# Patient Record
Sex: FEMALE | Race: OTHER | NOT HISPANIC OR LATINO | Employment: FULL TIME | URBAN - METROPOLITAN AREA
[De-identification: names, ages, dates, MRNs, and addresses within clinical notes are randomized per-mention and may not be internally consistent; named-entity substitution may affect disease eponyms.]

---

## 2018-03-27 PROBLEM — Z30.431 IUD CHECK UP: Status: ACTIVE | Noted: 2018-03-27

## 2018-04-30 PROBLEM — Z30.433 ENCOUNTER FOR IUD REMOVAL AND REINSERTION: Status: ACTIVE | Noted: 2018-04-30

## 2018-04-30 PROBLEM — Z01.818 PREOP EXAMINATION: Status: ACTIVE | Noted: 2018-04-30

## 2021-05-06 ENCOUNTER — PATIENT MESSAGE (OUTPATIENT)
Dept: RESEARCH | Facility: HOSPITAL | Age: 36
End: 2021-05-06

## 2022-01-12 ENCOUNTER — PATIENT MESSAGE (OUTPATIENT)
Dept: ADMINISTRATIVE | Facility: OTHER | Age: 37
End: 2022-01-12

## 2022-02-23 PROBLEM — F41.1 GENERALIZED ANXIETY DISORDER: Status: ACTIVE | Noted: 2022-02-23

## 2022-02-23 PROBLEM — Z01.818 PREOP EXAMINATION: Status: RESOLVED | Noted: 2018-04-30 | Resolved: 2022-02-23

## 2022-02-23 PROBLEM — J45.40 MODERATE PERSISTENT ASTHMA WITHOUT COMPLICATION: Status: ACTIVE | Noted: 2022-02-23

## 2022-02-23 PROBLEM — Z30.433 ENCOUNTER FOR IUD REMOVAL AND REINSERTION: Status: RESOLVED | Noted: 2018-04-30 | Resolved: 2022-02-23

## 2022-02-23 PROBLEM — Z30.431 IUD CHECK UP: Status: RESOLVED | Noted: 2018-03-27 | Resolved: 2022-02-23

## 2022-03-31 ENCOUNTER — OFFICE VISIT (OUTPATIENT)
Dept: URGENT CARE | Facility: CLINIC | Age: 37
End: 2022-03-31
Payer: COMMERCIAL

## 2022-03-31 VITALS
WEIGHT: 140 LBS | RESPIRATION RATE: 17 BRPM | HEART RATE: 99 BPM | OXYGEN SATURATION: 99 % | BODY MASS INDEX: 23.32 KG/M2 | SYSTOLIC BLOOD PRESSURE: 132 MMHG | HEIGHT: 65 IN | DIASTOLIC BLOOD PRESSURE: 85 MMHG | TEMPERATURE: 97 F

## 2022-03-31 DIAGNOSIS — S90.512A INFECTED ABRASION OF LEFT ANKLE, INITIAL ENCOUNTER: ICD-10-CM

## 2022-03-31 DIAGNOSIS — S99.912A INJURY OF LEFT ANKLE, INITIAL ENCOUNTER: Primary | ICD-10-CM

## 2022-03-31 DIAGNOSIS — L03.116 CELLULITIS OF LEFT ANKLE: ICD-10-CM

## 2022-03-31 DIAGNOSIS — S82.832A CLOSED FRACTURE OF DISTAL END OF LEFT FIBULA, UNSPECIFIED FRACTURE MORPHOLOGY, INITIAL ENCOUNTER: ICD-10-CM

## 2022-03-31 DIAGNOSIS — L08.9 INFECTED ABRASION OF LEFT ANKLE, INITIAL ENCOUNTER: ICD-10-CM

## 2022-03-31 PROCEDURE — 3079F DIAST BP 80-89 MM HG: CPT | Mod: CPTII,S$GLB,, | Performed by: PHYSICIAN ASSISTANT

## 2022-03-31 PROCEDURE — 90715 TDAP VACCINE GREATER THAN OR EQUAL TO 7YO IM: ICD-10-PCS | Mod: S$GLB,,, | Performed by: PHYSICIAN ASSISTANT

## 2022-03-31 PROCEDURE — 1160F PR REVIEW ALL MEDS BY PRESCRIBER/CLIN PHARMACIST DOCUMENTED: ICD-10-PCS | Mod: CPTII,S$GLB,, | Performed by: PHYSICIAN ASSISTANT

## 2022-03-31 PROCEDURE — 90715 TDAP VACCINE 7 YRS/> IM: CPT | Mod: S$GLB,,, | Performed by: PHYSICIAN ASSISTANT

## 2022-03-31 PROCEDURE — 99203 PR OFFICE/OUTPT VISIT, NEW, LEVL III, 30-44 MIN: ICD-10-PCS | Mod: 25,S$GLB,, | Performed by: PHYSICIAN ASSISTANT

## 2022-03-31 PROCEDURE — 1159F MED LIST DOCD IN RCRD: CPT | Mod: CPTII,S$GLB,, | Performed by: PHYSICIAN ASSISTANT

## 2022-03-31 PROCEDURE — 3079F PR MOST RECENT DIASTOLIC BLOOD PRESSURE 80-89 MM HG: ICD-10-PCS | Mod: CPTII,S$GLB,, | Performed by: PHYSICIAN ASSISTANT

## 2022-03-31 PROCEDURE — 73610 X-RAY EXAM OF ANKLE: CPT | Mod: LT,S$GLB,, | Performed by: RADIOLOGY

## 2022-03-31 PROCEDURE — 1159F PR MEDICATION LIST DOCUMENTED IN MEDICAL RECORD: ICD-10-PCS | Mod: CPTII,S$GLB,, | Performed by: PHYSICIAN ASSISTANT

## 2022-03-31 PROCEDURE — 3075F SYST BP GE 130 - 139MM HG: CPT | Mod: CPTII,S$GLB,, | Performed by: PHYSICIAN ASSISTANT

## 2022-03-31 PROCEDURE — 99203 OFFICE O/P NEW LOW 30 MIN: CPT | Mod: 25,S$GLB,, | Performed by: PHYSICIAN ASSISTANT

## 2022-03-31 PROCEDURE — 3008F PR BODY MASS INDEX (BMI) DOCUMENTED: ICD-10-PCS | Mod: CPTII,S$GLB,, | Performed by: PHYSICIAN ASSISTANT

## 2022-03-31 PROCEDURE — 73610 XR ANKLE COMPLETE 3 VIEW LEFT: ICD-10-PCS | Mod: LT,S$GLB,, | Performed by: RADIOLOGY

## 2022-03-31 PROCEDURE — 90471 TDAP VACCINE GREATER THAN OR EQUAL TO 7YO IM: ICD-10-PCS | Mod: S$GLB,,, | Performed by: PHYSICIAN ASSISTANT

## 2022-03-31 PROCEDURE — 3008F BODY MASS INDEX DOCD: CPT | Mod: CPTII,S$GLB,, | Performed by: PHYSICIAN ASSISTANT

## 2022-03-31 PROCEDURE — 3075F PR MOST RECENT SYSTOLIC BLOOD PRESS GE 130-139MM HG: ICD-10-PCS | Mod: CPTII,S$GLB,, | Performed by: PHYSICIAN ASSISTANT

## 2022-03-31 PROCEDURE — 90471 IMMUNIZATION ADMIN: CPT | Mod: S$GLB,,, | Performed by: PHYSICIAN ASSISTANT

## 2022-03-31 PROCEDURE — 1160F RVW MEDS BY RX/DR IN RCRD: CPT | Mod: CPTII,S$GLB,, | Performed by: PHYSICIAN ASSISTANT

## 2022-03-31 RX ORDER — MUPIROCIN 20 MG/G
OINTMENT TOPICAL 2 TIMES DAILY
Qty: 30 G | Refills: 0 | Status: SHIPPED | OUTPATIENT
Start: 2022-03-31 | End: 2022-04-10

## 2022-03-31 RX ORDER — TRAMADOL HYDROCHLORIDE 50 MG/1
50 TABLET ORAL EVERY 6 HOURS PRN
Qty: 12 TABLET | Refills: 0 | Status: SHIPPED | OUTPATIENT
Start: 2022-03-31 | End: 2022-04-10

## 2022-03-31 RX ORDER — CLINDAMYCIN HYDROCHLORIDE 150 MG/1
450 CAPSULE ORAL 3 TIMES DAILY
Qty: 90 CAPSULE | Refills: 0 | Status: SHIPPED | OUTPATIENT
Start: 2022-03-31 | End: 2022-04-10

## 2022-03-31 NOTE — PROGRESS NOTES
"Subjective:       Patient ID: Verónica Piña is a 37 y.o. female.    Vitals:  height is 5' 5" (1.651 m) and weight is 63.5 kg (140 lb). Her oral temperature is 97 °F (36.1 °C). Her blood pressure is 132/85 and her pulse is 99. Her respiration is 17 and oxygen saturation is 99%.     Chief Complaint: Leg Swelling (Left ankle, foot leg)    Patient stated over the weekend her dog took off running after a cat in her yard, but when her dog chased after the cat the rope the dog was attached to caught her ankle, which resulted in her falling.  Patient stated the water was hurting her foot when she took a bath.  Patient stated a rash is now on her left foot and her left foot looks swollen.  Unsure of her last tetanus shot.       Leg Pain   The incident occurred 3 to 5 days ago. The incident occurred at home. The injury mechanism was a fall. The pain is present in the left foot, left ankle, left leg and left toes. The quality of the pain is described as burning, aching, cramping, shooting and stabbing (throbbing). The pain is at a severity of 9/10. The pain is severe. The pain has been constant since onset. Associated symptoms include an inability to bear weight. It is unknown if a foreign body is present. The symptoms are aggravated by movement, weight bearing and palpation. Treatments tried: neosporin  The treatment provided mild relief.       Constitution: Negative for fever.   Musculoskeletal: Positive for pain, joint pain and joint swelling.   Skin: Positive for abrasion and erythema.       Objective:      Physical Exam   Constitutional: She is oriented to person, place, and time. She appears well-developed. She is cooperative.  Non-toxic appearance. She does not appear ill. No distress.   HENT:   Head: Normocephalic and atraumatic.   Ears:   Right Ear: Hearing normal.   Left Ear: Hearing normal.   Eyes: Conjunctivae and lids are normal. No scleral icterus.   Neck: Phonation normal. Neck supple.   Cardiovascular: " Normal rate and normal pulses.   Pulmonary/Chest: Effort normal. No respiratory distress.   Abdominal: Normal appearance.   Musculoskeletal:      Left ankle: She exhibits decreased range of motion and swelling. Tenderness.        Feet:       Comments: Intact distal pulses with no sensory deficits. Cap ref less than 3 secs. No bony deformity.    Neurological: She is alert and oriented to person, place, and time. Coordination normal.   Skin: Skin is intact, not diaphoretic and not pale. erythema   Psychiatric: Her speech is normal and behavior is normal. Judgment and thought content normal.   Nursing note and vitals reviewed.        Assessment:       1. Injury of left ankle, initial encounter    2. Infected abrasion of left ankle, initial encounter    3. Cellulitis of left ankle    4. Closed fracture of distal end of left fibula, unspecified fracture morphology, initial encounter          Plan:         Injury of left ankle, initial encounter  -     XR ANKLE COMPLETE 3 VIEW LEFT; Future; Expected date: 03/31/2022    Infected abrasion of left ankle, initial encounter  -     (In Office Administered) Tdap Vaccine  -     mupirocin (BACTROBAN) 2 % ointment; Apply topically 2 (two) times daily. for 10 days  Dispense: 30 g; Refill: 0    Cellulitis of left ankle  -     clindamycin (CLEOCIN) 150 MG capsule; Take 3 capsules (450 mg total) by mouth 3 (three) times daily. for 10 days  Dispense: 90 capsule; Refill: 0    Closed fracture of distal end of left fibula, unspecified fracture morphology, initial encounter  -     NON-PNEUMATIC WALKING BOOT FOR HOME USE  -     CRUTCHES FOR HOME USE  -     traMADoL (ULTRAM) 50 mg tablet; Take 1 tablet (50 mg total) by mouth every 6 (six) hours as needed for Pain.  Dispense: 12 tablet; Refill: 0    acute fracture of distal fibula. Nondisplaced.    Not open fracture. There is superficial infection due to abrasion with fracture from the injury. Elevate and close f/u with ortho. Take all  medications as prescribed. Urge nonweightbearing, elevation and rest at home. ER if infection does not improve or worsens in next 24-48 hours. Discussed with patient the importance of f/u with their primary care provider. Urged to go to the ER for any worsening signs or symptoms.

## 2022-03-31 NOTE — LETTER
March 31, 2022      [facility]  [address], [address]  [place] [address]  Phone: [phone]  Fax: [phone]       Patient: Verónica Piña   YOB: 1985  Date of Visit: 03/31/2022    To Whom It May Concern:    Michael Piña  was at [facility] on 03/31/2022. The patient may return to work/school with restrictions. Recommend nonweight bearing and sitting while working until cleared by Orthopedics. If you have any questions or concerns, or if I can be of further assistance, please do not hesitate to contact me.    Sincerely,    Keisha Mcgrath PA-C

## 2022-05-07 ENCOUNTER — OFFICE VISIT (OUTPATIENT)
Dept: URGENT CARE | Facility: CLINIC | Age: 37
End: 2022-05-07
Payer: COMMERCIAL

## 2022-05-07 VITALS
WEIGHT: 140 LBS | HEART RATE: 77 BPM | OXYGEN SATURATION: 99 % | TEMPERATURE: 98 F | RESPIRATION RATE: 16 BRPM | DIASTOLIC BLOOD PRESSURE: 69 MMHG | BODY MASS INDEX: 23.32 KG/M2 | SYSTOLIC BLOOD PRESSURE: 118 MMHG | HEIGHT: 65 IN

## 2022-05-07 DIAGNOSIS — S51.811A LACERATION OF RIGHT FOREARM, INITIAL ENCOUNTER: Primary | ICD-10-CM

## 2022-05-07 PROCEDURE — 99214 OFFICE O/P EST MOD 30 MIN: CPT | Mod: S$GLB,,, | Performed by: FAMILY MEDICINE

## 2022-05-07 PROCEDURE — 3078F PR MOST RECENT DIASTOLIC BLOOD PRESSURE < 80 MM HG: ICD-10-PCS | Mod: CPTII,S$GLB,, | Performed by: FAMILY MEDICINE

## 2022-05-07 PROCEDURE — 99214 PR OFFICE/OUTPT VISIT, EST, LEVL IV, 30-39 MIN: ICD-10-PCS | Mod: S$GLB,,, | Performed by: FAMILY MEDICINE

## 2022-05-07 PROCEDURE — 3074F SYST BP LT 130 MM HG: CPT | Mod: CPTII,S$GLB,, | Performed by: FAMILY MEDICINE

## 2022-05-07 PROCEDURE — 3008F BODY MASS INDEX DOCD: CPT | Mod: CPTII,S$GLB,, | Performed by: FAMILY MEDICINE

## 2022-05-07 PROCEDURE — 1160F PR REVIEW ALL MEDS BY PRESCRIBER/CLIN PHARMACIST DOCUMENTED: ICD-10-PCS | Mod: CPTII,S$GLB,, | Performed by: FAMILY MEDICINE

## 2022-05-07 PROCEDURE — 1159F PR MEDICATION LIST DOCUMENTED IN MEDICAL RECORD: ICD-10-PCS | Mod: CPTII,S$GLB,, | Performed by: FAMILY MEDICINE

## 2022-05-07 PROCEDURE — 1160F RVW MEDS BY RX/DR IN RCRD: CPT | Mod: CPTII,S$GLB,, | Performed by: FAMILY MEDICINE

## 2022-05-07 PROCEDURE — 1159F MED LIST DOCD IN RCRD: CPT | Mod: CPTII,S$GLB,, | Performed by: FAMILY MEDICINE

## 2022-05-07 PROCEDURE — 3078F DIAST BP <80 MM HG: CPT | Mod: CPTII,S$GLB,, | Performed by: FAMILY MEDICINE

## 2022-05-07 PROCEDURE — 3008F PR BODY MASS INDEX (BMI) DOCUMENTED: ICD-10-PCS | Mod: CPTII,S$GLB,, | Performed by: FAMILY MEDICINE

## 2022-05-07 PROCEDURE — 3074F PR MOST RECENT SYSTOLIC BLOOD PRESSURE < 130 MM HG: ICD-10-PCS | Mod: CPTII,S$GLB,, | Performed by: FAMILY MEDICINE

## 2022-05-07 RX ORDER — CEPHALEXIN 250 MG/1
250 CAPSULE ORAL 4 TIMES DAILY
Qty: 40 CAPSULE | Refills: 0 | Status: SHIPPED | OUTPATIENT
Start: 2022-05-07 | End: 2022-05-17

## 2022-05-07 RX ORDER — MUPIROCIN 20 MG/G
OINTMENT TOPICAL 2 TIMES DAILY
Qty: 30 G | Refills: 0 | Status: SHIPPED | OUTPATIENT
Start: 2022-05-07

## 2022-05-07 RX ORDER — NAPROXEN 375 MG/1
375 TABLET ORAL 2 TIMES DAILY
Qty: 20 TABLET | Refills: 0 | Status: SHIPPED | OUTPATIENT
Start: 2022-05-07

## 2022-05-07 NOTE — PATIENT INSTRUCTIONS
Please drink plenty of fluids.  Please get plenty of rest.  Please return here or go to the Emergency Department for any concerns or worsening of condition.  If you were prescribed antibiotics, please take them to completion.  If you were prescribed a narcotic medication, do not drive or operate heavy equipment or machinery while taking these medications.      If not allergic, please take over the counter Tylenol (Acetaminophen) and/or Motrin (Ibuprofen) as directed for control of pain and/or fever.    Your tetanus was brought up to date if needed.    Keep wound clean and dry.  You may use a plastic bag to keep area dry while showering    Clean wound once or twice a day with soap and water, then apply antibiotic ointment and redress wound.      Please follow up with your primary care doctor or specialist as needed.  Primary Doctor No  None    You must understand that you have received treatment at an Urgent Care facility only, and that you may be  released before all of your medical problems are known or treated. Urgent Care facilities are not equipped to  handle life threatening emergencies. It is recommended that you seek care at an Emergency Department for  further evaluation of worsening or concerning symptoms, or possibly life threatening conditions as  Discussed.    Laceration: All Closures  A laceration is a cut through the skin. This will usually require stitches (sutures) or staples if it is deep. Minor cuts may be treated with a surgical tape closure or skin glue.    Home care  Your healthcare provider may prescribe an antibiotic. This is to help prevent infection. Follow all instructions for taking this medicine. Take the medicine every day until it is gone or you are told to stop. You should not have any left over.  The healthcare provider may prescribe medicines for pain. Follow instructions for taking them.  Follow the healthcare providers instructions on how to care for the cut.  Keep the wound clean  and dry. Do not get the wound wet until you are told it is okay to do so. If the area gets wet, gently pat it dry with a clean cloth. Replace the wet bandage with a dry one.  If a bandage was applied and it becomes wet or dirty, replace it. Otherwise, leave it in place for the first 24 hours.  Caring for sutures or staples: Once you no longer need to keep them dry, clean the wound daily. First, remove the bandage. Then wash the area gently with soap and warm water, or as directed by the health care provider. Use a wet cotton swab to loosen and remove any blood or crust that forms. After cleaning, apply a thin layer of antibiotic ointment if advised. Then put on a new bandage unless you are told not to.  Caring for skin glue: Dont put apply liquid, ointment, or cream on the wound while the glue is in place. Avoid activities that cause heavy sweating. Protect the wound from sunlight. Do not scratch, rub, or pick at the adhesive film. Do not place tape directly over the film. The glue should peel off within 5 to 10 days.   Caring for surgical tape: Keep the area dry. If it gets wet, blot it dry with a clean towel. Surgical tape usually falls off within 7 to 10 days. If it has not fallen off after 10 days, you can take it off yourself. Put mineral oil or petroleum jelly on a cotton ball and gently rub the tape until it is removed.  Once you can get the wound wet, you may shower as usual but do not soak the wound in water (no tub baths or swimming)  Even with proper treatment, a wound infection may sometimes occur. Check the wound daily for signs of infection listed below.  Scalp wounds  During the first two days, you may carefully rinse your hair in the shower to remove blood, glass or dirt particles. After two days, you may shower and shampoo your hair normally. Do not soak your scalp in the tub or go swimming until the stitches or staples have been removed. Talk with your healthcare provider before applying any  antibiotic ointment to the wound.  Mouth wounds  Eat soft foods to reduce pain. If the cut is inside of your mouth, clean by rinsing after each meal and at bedtime with a mixture of equal parts water and hydrogen peroxide (do not swallow!). Or, you can use a cotton swab to directly apply hydrogen peroxide onto the cut. Mouth wounds can be painful when eating. You may use an over-the-counter local numbing solution for pain relief. If this is not available, you may use any numbing solution intended for teething babies. You may apply this directly to the sores with a cotton-tip swab or with your finger.  Follow-up care  Follow up with your healthcare provider as advised. Ask your healthcare provider how long sutures should be left in place. Be sure to return for suture removal as directed. If dissolving stitches were used in the mouth, these should fall out or dissolve without the need for removal. If tape closures were used, remove them yourself when your provider recommends if they have not fallen off on their own. If skin glue was used, the film will wear off by itself.  When to seek medical advice  Call your healthcare provider right away if any of these occur:  Wound bleeding not controlled by direct pressure  Signs of infection, including increasing pain in the wound, increasing wound redness or swelling, or pus or bad odor coming from the wound  Fever of 100.4°F (38.ºC) or higher or as directed by your healthcare provider  Stitches or staples come apart or fall out or surgical tape falls off before 7 days  Wound edges re-open  Wound changes colors  Numbness around the wound   Decreased movement around the injured area  Date Last Reviewed: 6/14/2015 © 2000-2016 [employer]. [address] [place] [address], [place] [address]. All rights reserved. This information is not intended as a substitute for professional medical care. Always follow your healthcare professional's instructions.

## 2022-05-07 NOTE — PROGRESS NOTES
"Subjective:       Patient ID: Verónica Piña is a 37 y.o. female.    Vitals:  height is 5' 5" (1.651 m) and weight is 63.5 kg (140 lb). Her tympanic temperature is 98.4 °F (36.9 °C). Her blood pressure is 118/69 and her pulse is 77. Her respiration is 16 and oxygen saturation is 99%.     Chief Complaint: Laceration (Pt. presents today w/ laceration to right forearm w/ swelling. State's slipped and fell onto unknown foreign body.  X1 day.   )    Laceration   The incident occurred 12 to 24 hours ago. The laceration is located on the right arm. The laceration mechanism is unknown.The pain is at a severity of 3/10. The pain is mild. The pain has been constant since onset. Her tetanus status is UTD.       Constitution: Negative.   HENT: Negative.    Cardiovascular: Negative.    Eyes: Negative.    Respiratory: Negative.    Gastrointestinal: Negative.    Endocrine: negative.   Genitourinary: Negative.    Musculoskeletal: Positive for pain, trauma, joint pain and joint swelling.   Skin: Negative.  Positive for laceration.   Allergic/Immunologic: Negative.  Positive for immunizations up-to-date.   Neurological: Negative.    Hematologic/Lymphatic: Negative.    Psychiatric/Behavioral: Negative.        Objective:      Physical Exam   Constitutional: She is oriented to person, place, and time. She appears well-developed. She is cooperative.  Non-toxic appearance. She does not appear ill. No distress.   HENT:   Head: Normocephalic and atraumatic. Head is without abrasion, without contusion and without laceration.   Ears:   Right Ear: Hearing, tympanic membrane, external ear and ear canal normal. No hemotympanum.   Left Ear: Hearing, tympanic membrane, external ear and ear canal normal. No hemotympanum.   Nose: Nose normal. No mucosal edema, rhinorrhea or nasal deformity. No epistaxis. Right sinus exhibits no maxillary sinus tenderness and no frontal sinus tenderness. Left sinus exhibits no maxillary sinus tenderness and no " frontal sinus tenderness.   Mouth/Throat: Uvula is midline, oropharynx is clear and moist and mucous membranes are normal. No trismus in the jaw. Normal dentition. No uvula swelling. No posterior oropharyngeal erythema.   Eyes: Conjunctivae, EOM and lids are normal. Pupils are equal, round, and reactive to light. Right eye exhibits no discharge. Left eye exhibits no discharge. No scleral icterus.   Neck: Trachea normal and phonation normal. Neck supple. No tracheal deviation present. No neck rigidity present. No spinous process tenderness present. No muscular tenderness present.   Cardiovascular: Normal rate, regular rhythm, normal heart sounds and normal pulses.   Pulmonary/Chest: Effort normal and breath sounds normal. No respiratory distress.   Abdominal: Normal appearance and bowel sounds are normal. She exhibits no distension, no pulsatile midline mass and no mass. Soft. There is no abdominal tenderness.   Musculoskeletal: Normal range of motion.         General: No deformity. Normal range of motion.      Right forearm: She exhibits laceration.        Arms:    Neurological: She is alert and oriented to person, place, and time. She has normal strength. No cranial nerve deficit or sensory deficit. She exhibits normal muscle tone. She displays no seizure activity. Coordination normal. GCS eye subscore is 4. GCS verbal subscore is 5. GCS motor subscore is 6.   Skin: Skin is warm, dry, intact, not diaphoretic and not pale. Capillary refill takes less than 2 seconds. Lacerations - upper ext.:  right forearmNo abrasion, No burn, No bruising and No ecchymosis   Psychiatric: Her speech is normal and behavior is normal. Judgment and thought content normal.   Nursing note and vitals reviewed.        Assessment:       1. Laceration of right forearm, initial encounter          Plan:     Xray not available today, patient was informed that xray not available today before signing in.  Td up to date.    Pressure dressing  applied to wound in office today.    Laceration of right forearm, initial encounter  -     cephALEXin (KEFLEX) 250 MG capsule; Take 1 capsule (250 mg total) by mouth 4 (four) times daily. for 10 days  Dispense: 40 capsule; Refill: 0  -     mupirocin (BACTROBAN) 2 % ointment; Apply topically 2 (two) times daily.  Dispense: 30 g; Refill: 0  -     naproxen (NAPROSYN) 375 MG tablet; Take 1 tablet (375 mg total) by mouth 2 (two) times daily.  Dispense: 20 tablet; Refill: 0     Please drink plenty of fluids.  Please get plenty of rest.  Please return here or go to the Emergency Department for any concerns or worsening of condition.  If you were prescribed antibiotics, please take them to completion.  If you were prescribed a narcotic medication, do not drive or operate heavy equipment or machinery while taking these medications.      If not allergic, please take over the counter Tylenol (Acetaminophen) and/or Motrin (Ibuprofen) as directed for control of pain and/or fever.    Your tetanus was brought up to date if needed.    Keep wound clean and dry.  You may use a plastic bag to keep area dry while showering    Clean wound once or twice a day with soap and water, then apply antibiotic ointment and redress wound.      Please follow up with your primary care doctor or specialist as needed.  Primary Doctor No  None    You must understand that you have received treatment at an Urgent Care facility only, and that you may be  released before all of your medical problems are known or treated. Urgent Care facilities are not equipped to  handle life threatening emergencies. It is recommended that you seek care at an Emergency Department for  further evaluation of worsening or concerning symptoms, or possibly life threatening conditions as  discussed.

## 2022-05-07 NOTE — LETTER
May 7, 2022  Verónica Piña  [address] [place] [address]                [facility]  [address], [address]  [place] [address]  Phone: [phone]  Fax: [phone] Verónica Piña was seen and treated in our Urgent Care department on 5/7/2022. She may return to work in 2 - 3 days.      If you have any questions or concerns, please don't hesitate to call.        Sincerely,        Tommy Varma MD

## 2022-05-13 ENCOUNTER — OFFICE VISIT (OUTPATIENT)
Dept: URGENT CARE | Facility: CLINIC | Age: 37
End: 2022-05-13
Payer: COMMERCIAL

## 2022-05-13 VITALS
TEMPERATURE: 99 F | BODY MASS INDEX: 23.32 KG/M2 | OXYGEN SATURATION: 98 % | RESPIRATION RATE: 18 BRPM | SYSTOLIC BLOOD PRESSURE: 134 MMHG | WEIGHT: 140 LBS | HEART RATE: 88 BPM | DIASTOLIC BLOOD PRESSURE: 88 MMHG | HEIGHT: 65 IN

## 2022-05-13 DIAGNOSIS — S40.021A CONTUSION OF RIGHT UPPER EXTREMITY, INITIAL ENCOUNTER: Primary | ICD-10-CM

## 2022-05-13 PROCEDURE — 1159F MED LIST DOCD IN RCRD: CPT | Mod: CPTII,S$GLB,, | Performed by: FAMILY MEDICINE

## 2022-05-13 PROCEDURE — 1159F PR MEDICATION LIST DOCUMENTED IN MEDICAL RECORD: ICD-10-PCS | Mod: CPTII,S$GLB,, | Performed by: FAMILY MEDICINE

## 2022-05-13 PROCEDURE — 99214 PR OFFICE/OUTPT VISIT, EST, LEVL IV, 30-39 MIN: ICD-10-PCS | Mod: S$GLB,,, | Performed by: FAMILY MEDICINE

## 2022-05-13 PROCEDURE — 3075F PR MOST RECENT SYSTOLIC BLOOD PRESS GE 130-139MM HG: ICD-10-PCS | Mod: CPTII,S$GLB,, | Performed by: FAMILY MEDICINE

## 2022-05-13 PROCEDURE — 3079F DIAST BP 80-89 MM HG: CPT | Mod: CPTII,S$GLB,, | Performed by: FAMILY MEDICINE

## 2022-05-13 PROCEDURE — 73090 XR FOREARM RIGHT: ICD-10-PCS | Mod: RT,S$GLB,, | Performed by: RADIOLOGY

## 2022-05-13 PROCEDURE — 1160F RVW MEDS BY RX/DR IN RCRD: CPT | Mod: CPTII,S$GLB,, | Performed by: FAMILY MEDICINE

## 2022-05-13 PROCEDURE — 3075F SYST BP GE 130 - 139MM HG: CPT | Mod: CPTII,S$GLB,, | Performed by: FAMILY MEDICINE

## 2022-05-13 PROCEDURE — 73090 X-RAY EXAM OF FOREARM: CPT | Mod: RT,S$GLB,, | Performed by: RADIOLOGY

## 2022-05-13 PROCEDURE — 99214 OFFICE O/P EST MOD 30 MIN: CPT | Mod: S$GLB,,, | Performed by: FAMILY MEDICINE

## 2022-05-13 PROCEDURE — 3008F PR BODY MASS INDEX (BMI) DOCUMENTED: ICD-10-PCS | Mod: CPTII,S$GLB,, | Performed by: FAMILY MEDICINE

## 2022-05-13 PROCEDURE — 3008F BODY MASS INDEX DOCD: CPT | Mod: CPTII,S$GLB,, | Performed by: FAMILY MEDICINE

## 2022-05-13 PROCEDURE — 3079F PR MOST RECENT DIASTOLIC BLOOD PRESSURE 80-89 MM HG: ICD-10-PCS | Mod: CPTII,S$GLB,, | Performed by: FAMILY MEDICINE

## 2022-05-13 PROCEDURE — 1160F PR REVIEW ALL MEDS BY PRESCRIBER/CLIN PHARMACIST DOCUMENTED: ICD-10-PCS | Mod: CPTII,S$GLB,, | Performed by: FAMILY MEDICINE

## 2022-05-13 RX ORDER — NAPROXEN 375 MG/1
375 TABLET ORAL 2 TIMES DAILY
Qty: 20 TABLET | Refills: 0 | Status: SHIPPED | OUTPATIENT
Start: 2022-05-13

## 2022-05-13 NOTE — LETTER
May 13, 2022  Verónica Piña  [address] [place] [address]                [facility]  [address], [address]  [place] [address]  Phone: [phone]  Fax: [phone] Verónica Piña was seen and treated in our Urgent Care department on 5/13/2022. She may return to work in 2 - 3 days.      If you have any questions or concerns, please don't hesitate to call.        Sincerely,        Tommy Varma MD

## 2022-05-13 NOTE — PROGRESS NOTES
"Subjective:       Patient ID: Verónica Piña is a 37 y.o. female.    Vitals:  height is 5' 5" (1.651 m) and weight is 63.5 kg (140 lb). Her tympanic temperature is 98.8 °F (37.1 °C). Her blood pressure is 134/88 and her pulse is 88. Her respiration is 18 and oxygen saturation is 98%.     Chief Complaint: Wrist Pain    Patient came in on the 7th and her ankles or both swollen and now her right wrist hurts when she moves it a certain way.  Feeling like a weird sensation to it.     Wrist Pain   The pain is present in the right wrist. This is a new problem. The current episode started in the past 7 days. The problem occurs constantly. The problem has been gradually worsening. The quality of the pain is described as aching. The pain is at a severity of 0/10. The patient is experiencing no pain. Associated symptoms include an inability to bear weight and numbness. She has tried NSAIDS for the symptoms. The treatment provided no relief.       Constitution: Negative.   HENT: Negative.    Cardiovascular: Negative.    Eyes: Negative.    Respiratory: Negative.    Gastrointestinal: Negative.    Endocrine: negative.   Genitourinary: Negative.    Musculoskeletal: Negative.    Skin: Negative.    Allergic/Immunologic: Negative.    Neurological: Positive for numbness.   Hematologic/Lymphatic: Negative.    Psychiatric/Behavioral: Negative.        Objective:      Physical Exam   Constitutional: She is oriented to person, place, and time. She appears well-developed. She is cooperative.  Non-toxic appearance. She does not appear ill. No distress.   HENT:   Head: Normocephalic and atraumatic. Head is without abrasion, without contusion and without laceration.   Ears:   Right Ear: Hearing, tympanic membrane, external ear and ear canal normal. No hemotympanum.   Left Ear: Hearing, tympanic membrane, external ear and ear canal normal. No hemotympanum.   Nose: Nose normal. No mucosal edema, rhinorrhea or nasal deformity. No epistaxis. " Right sinus exhibits no maxillary sinus tenderness and no frontal sinus tenderness. Left sinus exhibits no maxillary sinus tenderness and no frontal sinus tenderness.   Mouth/Throat: Uvula is midline, oropharynx is clear and moist and mucous membranes are normal. No trismus in the jaw. Normal dentition. No uvula swelling. No posterior oropharyngeal erythema.   Eyes: Conjunctivae, EOM and lids are normal. Pupils are equal, round, and reactive to light. Right eye exhibits no discharge. Left eye exhibits no discharge. No scleral icterus.   Neck: Trachea normal and phonation normal. Neck supple. No tracheal deviation present. No neck rigidity present. No spinous process tenderness present. No muscular tenderness present.   Cardiovascular: Normal rate, regular rhythm, normal heart sounds and normal pulses.   Pulmonary/Chest: Effort normal and breath sounds normal. No respiratory distress.   Abdominal: Normal appearance and bowel sounds are normal. She exhibits no distension, no pulsatile midline mass and no mass. Soft. There is no abdominal tenderness.   Musculoskeletal: Normal range of motion.         General: No deformity. Normal range of motion.      Right forearm: She exhibits tenderness.        Arms:    Neurological: She is alert and oriented to person, place, and time. She has normal strength. No cranial nerve deficit or sensory deficit. She exhibits normal muscle tone. She displays no seizure activity. Coordination normal. GCS eye subscore is 4. GCS verbal subscore is 5. GCS motor subscore is 6.   Skin: Skin is warm, dry, intact, not diaphoretic and not pale. Capillary refill takes less than 2 seconds. No abrasion, No burn, No bruising and No ecchymosis   Psychiatric: Her speech is normal and behavior is normal. Judgment and thought content normal.   Nursing note and vitals reviewed.    Type of Interpretation: ED Physician (Independently Interpreted).  Radiology Procedure Done: Right Forearm.  Interpretation: No  fx or FB seen.          Assessment:       1. Contusion of right upper extremity, initial encounter          Plan:         Contusion of right upper extremity, initial encounter  -     XR FOREARM RIGHT; Future; Expected date: 05/13/2022  -     naproxen (NAPROSYN) 375 MG tablet; Take 1 tablet (375 mg total) by mouth 2 (two) times daily.  Dispense: 20 tablet; Refill: 0     Please drink plenty of fluids.  Please get plenty of rest.  Please return here or go to the Emergency Department for any concerns or worsening of condition.  If you were prescribed a narcotic medication, do not drive or operate heavy equipment or machinery while taking these medications.  If you were not prescribed an anti-inflammatory medication, and if you do not have any history of stomach/intestinal ulcers, or kidney disease, or are not taking a blood thinner such as Coumadin, Plavix, Pradaxa, Eloquis, or Xaralta for example, it is OK to take over the counter Ibuprofen or Advil or Motrin or Aleve as directed.  Do not take these medications on an empty stomach.  Rest, ice, compression and elevation to the affected joint or limb as needed.    If you were given a sling, wear it for comfort until follow up as arranged.  If you were given or placed in a splint, wear it until your follow up visit or recheck.  If you  smoke, please stop smoking.       Please follow up with your primary care doctor or specialist as needed.    Primary Doctor No  None    You must understand that you have received treatment at an Urgent Care facility only, and that you may be  released before all of your medical problems are known or treated. Urgent Care facilities are not equipped to  handle life threatening emergencies. It is recommended that you seek care at an Emergency Department for  further evaluation of worsening or concerning symptoms, or possibly life threatening conditions as  discussed.

## 2022-05-13 NOTE — PATIENT INSTRUCTIONS

## 2023-12-23 ENCOUNTER — PATIENT MESSAGE (OUTPATIENT)
Dept: OBSTETRICS AND GYNECOLOGY | Facility: HOSPITAL | Age: 38
End: 2023-12-23
Payer: MEDICAID

## 2023-12-23 DIAGNOSIS — N60.02 BREAST CYST, LEFT: Primary | ICD-10-CM

## 2024-08-30 PROBLEM — K42.9 UMBILICAL HERNIA: Status: ACTIVE | Noted: 2024-08-30

## 2024-08-31 PROBLEM — K92.0 HEMATEMESIS: Status: ACTIVE | Noted: 2024-08-31

## 2024-08-31 PROBLEM — F10.939 ALCOHOL WITHDRAWAL SYNDROME WITH COMPLICATION: Status: ACTIVE | Noted: 2024-08-31

## 2024-08-31 PROBLEM — R10.9 ABDOMINAL PAIN: Status: ACTIVE | Noted: 2024-08-31

## 2024-09-02 PROBLEM — K92.0 HEMATEMESIS: Status: RESOLVED | Noted: 2024-08-31 | Resolved: 2024-09-02

## 2024-09-02 PROBLEM — R10.9 ABDOMINAL PAIN: Status: RESOLVED | Noted: 2024-08-31 | Resolved: 2024-09-02

## 2024-12-10 ENCOUNTER — HOSPITAL ENCOUNTER (INPATIENT)
Facility: HOSPITAL | Age: 39
LOS: 5 days | Discharge: HOME | DRG: 897 | End: 2024-12-15
Attending: STUDENT IN AN ORGANIZED HEALTH CARE EDUCATION/TRAINING PROGRAM
Payer: MEDICAID

## 2024-12-10 DIAGNOSIS — K92.1 MELENA: ICD-10-CM

## 2024-12-10 DIAGNOSIS — F10.90 ALCOHOL USE DISORDER: ICD-10-CM

## 2024-12-10 DIAGNOSIS — F41.9 ANXIETY: ICD-10-CM

## 2024-12-10 DIAGNOSIS — K92.0 HEMATEMESIS, UNSPECIFIED WHETHER NAUSEA PRESENT: Primary | ICD-10-CM

## 2024-12-10 DIAGNOSIS — F41.1 GENERALIZED ANXIETY DISORDER: ICD-10-CM

## 2024-12-10 DIAGNOSIS — F10.939 ALCOHOL WITHDRAWAL SYNDROME WITH COMPLICATION: ICD-10-CM

## 2024-12-10 DIAGNOSIS — R56.9 SEIZURE: ICD-10-CM

## 2024-12-10 DIAGNOSIS — R07.9 CHEST PAIN: ICD-10-CM

## 2024-12-10 PROBLEM — Q24.8 PERICARDIAL CYST: Status: ACTIVE | Noted: 2024-12-10

## 2024-12-10 PROBLEM — D62 ACUTE BLOOD LOSS ANEMIA: Status: ACTIVE | Noted: 2024-12-10

## 2024-12-10 PROBLEM — K92.2 UPPER GI BLEED: Status: ACTIVE | Noted: 2024-08-31

## 2024-12-10 LAB
ABO + RH BLD: NORMAL
ALBUMIN SERPL BCP-MCNC: 3.8 G/DL (ref 3.5–5.2)
ALLENS TEST: NORMAL
ALP SERPL-CCNC: 98 U/L (ref 40–150)
ALT SERPL W/O P-5'-P-CCNC: 49 U/L (ref 10–44)
AMPHET+METHAMPHET UR QL: NEGATIVE
ANION GAP SERPL CALC-SCNC: 14 MMOL/L (ref 8–16)
AST SERPL-CCNC: 81 U/L (ref 10–40)
B-HCG UR QL: NEGATIVE
BARBITURATES UR QL SCN>200 NG/ML: NEGATIVE
BASOPHILS # BLD AUTO: 0.05 K/UL (ref 0–0.2)
BASOPHILS # BLD AUTO: 0.06 K/UL (ref 0–0.2)
BASOPHILS NFR BLD: 0.7 % (ref 0–1.9)
BASOPHILS NFR BLD: 1 % (ref 0–1.9)
BENZODIAZ UR QL SCN>200 NG/ML: NEGATIVE
BILIRUB SERPL-MCNC: 0.3 MG/DL (ref 0.1–1)
BILIRUB UR QL STRIP: NEGATIVE
BLD GP AB SCN CELLS X3 SERPL QL: NORMAL
BUN SERPL-MCNC: 7 MG/DL (ref 6–20)
BZE UR QL SCN: NEGATIVE
CALCIUM SERPL-MCNC: 9 MG/DL (ref 8.7–10.5)
CANNABINOIDS UR QL SCN: NEGATIVE
CHLORIDE SERPL-SCNC: 106 MMOL/L (ref 95–110)
CK SERPL-CCNC: 112 U/L (ref 20–180)
CLARITY UR REFRACT.AUTO: CLEAR
CO2 SERPL-SCNC: 22 MMOL/L (ref 23–29)
COLOR UR AUTO: COLORLESS
CREAT SERPL-MCNC: 0.7 MG/DL (ref 0.5–1.4)
CREAT UR-MCNC: 46 MG/DL (ref 15–325)
CTP QC/QA: YES
DIFFERENTIAL METHOD BLD: ABNORMAL
DIFFERENTIAL METHOD BLD: ABNORMAL
EOSINOPHIL # BLD AUTO: 0.1 K/UL (ref 0–0.5)
EOSINOPHIL # BLD AUTO: 0.2 K/UL (ref 0–0.5)
EOSINOPHIL NFR BLD: 1.8 % (ref 0–8)
EOSINOPHIL NFR BLD: 3 % (ref 0–8)
ERYTHROCYTE [DISTWIDTH] IN BLOOD BY AUTOMATED COUNT: 14.7 % (ref 11.5–14.5)
ERYTHROCYTE [DISTWIDTH] IN BLOOD BY AUTOMATED COUNT: 14.8 % (ref 11.5–14.5)
EST. GFR  (NO RACE VARIABLE): >60 ML/MIN/1.73 M^2
ETHANOL SERPL-MCNC: 208 MG/DL
ETHANOL UR-MCNC: 308 MG/DL
GLUCOSE SERPL-MCNC: 96 MG/DL (ref 70–110)
GLUCOSE UR QL STRIP: NEGATIVE
HCT VFR BLD AUTO: 31 % (ref 37–48.5)
HCT VFR BLD AUTO: 34.5 % (ref 37–48.5)
HCV AB SERPL QL IA: NORMAL
HGB BLD-MCNC: 10.6 G/DL (ref 12–16)
HGB BLD-MCNC: 11.5 G/DL (ref 12–16)
HGB UR QL STRIP: NEGATIVE
HIV 1+2 AB+HIV1 P24 AG SERPL QL IA: NORMAL
IMM GRANULOCYTES # BLD AUTO: 0.02 K/UL (ref 0–0.04)
IMM GRANULOCYTES # BLD AUTO: 0.04 K/UL (ref 0–0.04)
IMM GRANULOCYTES NFR BLD AUTO: 0.3 % (ref 0–0.5)
IMM GRANULOCYTES NFR BLD AUTO: 0.6 % (ref 0–0.5)
INR PPP: 1 (ref 0.8–1.2)
KETONES UR QL STRIP: NEGATIVE
LDH SERPL L TO P-CCNC: 1.82 MMOL/L (ref 0.5–2.2)
LEUKOCYTE ESTERASE UR QL STRIP: NEGATIVE
LYMPHOCYTES # BLD AUTO: 1 K/UL (ref 1–4.8)
LYMPHOCYTES # BLD AUTO: 1.4 K/UL (ref 1–4.8)
LYMPHOCYTES NFR BLD: 13.4 % (ref 18–48)
LYMPHOCYTES NFR BLD: 21.9 % (ref 18–48)
MAGNESIUM SERPL-MCNC: 1.9 MG/DL (ref 1.6–2.6)
MCH RBC QN AUTO: 32.5 PG (ref 27–31)
MCH RBC QN AUTO: 33.1 PG (ref 27–31)
MCHC RBC AUTO-ENTMCNC: 33.3 G/DL (ref 32–36)
MCHC RBC AUTO-ENTMCNC: 34.2 G/DL (ref 32–36)
MCV RBC AUTO: 97 FL (ref 82–98)
MCV RBC AUTO: 98 FL (ref 82–98)
METHADONE UR QL SCN>300 NG/ML: NEGATIVE
MONOCYTES # BLD AUTO: 0.7 K/UL (ref 0.3–1)
MONOCYTES # BLD AUTO: 0.8 K/UL (ref 0.3–1)
MONOCYTES NFR BLD: 13 % (ref 4–15)
MONOCYTES NFR BLD: 9.3 % (ref 4–15)
NEUTROPHILS # BLD AUTO: 3.8 K/UL (ref 1.8–7.7)
NEUTROPHILS # BLD AUTO: 5.5 K/UL (ref 1.8–7.7)
NEUTROPHILS NFR BLD: 60.5 % (ref 38–73)
NEUTROPHILS NFR BLD: 74.5 % (ref 38–73)
NITRITE UR QL STRIP: NEGATIVE
NRBC BLD-RTO: 0 /100 WBC
NRBC BLD-RTO: 0 /100 WBC
OHS QRS DURATION: 80 MS
OHS QTC CALCULATION: 436 MS
OPIATES UR QL SCN: NEGATIVE
PCP UR QL SCN>25 NG/ML: NEGATIVE
PH UR STRIP: 6 [PH] (ref 5–8)
PHOSPHATE SERPL-MCNC: 3.3 MG/DL (ref 2.7–4.5)
PLATELET # BLD AUTO: 111 K/UL (ref 150–450)
PLATELET # BLD AUTO: 99 K/UL (ref 150–450)
PMV BLD AUTO: 9.9 FL (ref 9.2–12.9)
PMV BLD AUTO: 9.9 FL (ref 9.2–12.9)
POTASSIUM SERPL-SCNC: 3.9 MMOL/L (ref 3.5–5.1)
PROT SERPL-MCNC: 7.1 G/DL (ref 6–8.4)
PROT UR QL STRIP: NEGATIVE
PROTHROMBIN TIME: 10.9 SEC (ref 9–12.5)
RBC # BLD AUTO: 3.2 M/UL (ref 4–5.4)
RBC # BLD AUTO: 3.54 M/UL (ref 4–5.4)
SAMPLE: NORMAL
SITE: NORMAL
SODIUM SERPL-SCNC: 142 MMOL/L (ref 136–145)
SP GR UR STRIP: 1 (ref 1–1.03)
SPECIMEN OUTDATE: NORMAL
TOXICOLOGY INFORMATION: ABNORMAL
URN SPEC COLLECT METH UR: ABNORMAL
WBC # BLD AUTO: 6.3 K/UL (ref 3.9–12.7)
WBC # BLD AUTO: 7.4 K/UL (ref 3.9–12.7)

## 2024-12-10 PROCEDURE — 81003 URINALYSIS AUTO W/O SCOPE: CPT

## 2024-12-10 PROCEDURE — 84100 ASSAY OF PHOSPHORUS: CPT

## 2024-12-10 PROCEDURE — 83605 ASSAY OF LACTIC ACID: CPT

## 2024-12-10 PROCEDURE — 83735 ASSAY OF MAGNESIUM: CPT

## 2024-12-10 PROCEDURE — 96375 TX/PRO/DX INJ NEW DRUG ADDON: CPT

## 2024-12-10 PROCEDURE — 87389 HIV-1 AG W/HIV-1&-2 AB AG IA: CPT | Performed by: PHYSICIAN ASSISTANT

## 2024-12-10 PROCEDURE — 85610 PROTHROMBIN TIME: CPT | Performed by: STUDENT IN AN ORGANIZED HEALTH CARE EDUCATION/TRAINING PROGRAM

## 2024-12-10 PROCEDURE — 85025 COMPLETE CBC W/AUTO DIFF WBC: CPT

## 2024-12-10 PROCEDURE — 93005 ELECTROCARDIOGRAM TRACING: CPT

## 2024-12-10 PROCEDURE — 81025 URINE PREGNANCY TEST: CPT | Performed by: STUDENT IN AN ORGANIZED HEALTH CARE EDUCATION/TRAINING PROGRAM

## 2024-12-10 PROCEDURE — 93010 ELECTROCARDIOGRAM REPORT: CPT | Mod: ,,, | Performed by: INTERNAL MEDICINE

## 2024-12-10 PROCEDURE — 82077 ASSAY SPEC XCP UR&BREATH IA: CPT

## 2024-12-10 PROCEDURE — 96374 THER/PROPH/DIAG INJ IV PUSH: CPT

## 2024-12-10 PROCEDURE — 96361 HYDRATE IV INFUSION ADD-ON: CPT

## 2024-12-10 PROCEDURE — 80307 DRUG TEST PRSMV CHEM ANLYZR: CPT

## 2024-12-10 PROCEDURE — 86803 HEPATITIS C AB TEST: CPT | Performed by: PHYSICIAN ASSISTANT

## 2024-12-10 PROCEDURE — 63600175 PHARM REV CODE 636 W HCPCS

## 2024-12-10 PROCEDURE — 25000003 PHARM REV CODE 250

## 2024-12-10 PROCEDURE — 85025 COMPLETE CBC W/AUTO DIFF WBC: CPT | Mod: 91

## 2024-12-10 PROCEDURE — 12000002 HC ACUTE/MED SURGE SEMI-PRIVATE ROOM

## 2024-12-10 PROCEDURE — 82550 ASSAY OF CK (CPK): CPT

## 2024-12-10 PROCEDURE — 86901 BLOOD TYPING SEROLOGIC RH(D): CPT

## 2024-12-10 PROCEDURE — 99900035 HC TECH TIME PER 15 MIN (STAT)

## 2024-12-10 PROCEDURE — 99285 EMERGENCY DEPT VISIT HI MDM: CPT | Mod: 25

## 2024-12-10 PROCEDURE — 80053 COMPREHEN METABOLIC PANEL: CPT

## 2024-12-10 RX ORDER — PAROXETINE 10 MG/1
20 TABLET, FILM COATED ORAL EVERY MORNING
Status: DISCONTINUED | OUTPATIENT
Start: 2024-12-11 | End: 2024-12-15 | Stop reason: HOSPADM

## 2024-12-10 RX ORDER — ONDANSETRON 4 MG/1
4 TABLET, ORALLY DISINTEGRATING ORAL
Status: COMPLETED | OUTPATIENT
Start: 2024-12-10 | End: 2024-12-10

## 2024-12-10 RX ORDER — ACETAMINOPHEN 325 MG/1
650 TABLET ORAL EVERY 6 HOURS PRN
Status: DISCONTINUED | OUTPATIENT
Start: 2024-12-10 | End: 2024-12-15 | Stop reason: HOSPADM

## 2024-12-10 RX ORDER — DIAZEPAM 5 MG/1
10 TABLET ORAL EVERY 6 HOURS PRN
Status: DISCONTINUED | OUTPATIENT
Start: 2024-12-10 | End: 2024-12-10

## 2024-12-10 RX ORDER — ONDANSETRON 4 MG/1
4 TABLET, ORALLY DISINTEGRATING ORAL EVERY 6 HOURS PRN
Status: DISCONTINUED | OUTPATIENT
Start: 2024-12-10 | End: 2024-12-15 | Stop reason: HOSPADM

## 2024-12-10 RX ORDER — LINEZOLID 2 MG/ML
600 INJECTION, SOLUTION INTRAVENOUS
Status: DISCONTINUED | OUTPATIENT
Start: 2024-12-10 | End: 2024-12-10

## 2024-12-10 RX ORDER — GLUCAGON 1 MG
1 KIT INJECTION
Status: DISCONTINUED | OUTPATIENT
Start: 2024-12-10 | End: 2024-12-15 | Stop reason: HOSPADM

## 2024-12-10 RX ORDER — DIAZEPAM 10 MG/2ML
5 INJECTION INTRAMUSCULAR
Status: COMPLETED | OUTPATIENT
Start: 2024-12-10 | End: 2024-12-10

## 2024-12-10 RX ORDER — PANTOPRAZOLE SODIUM 40 MG/10ML
80 INJECTION, POWDER, LYOPHILIZED, FOR SOLUTION INTRAVENOUS
Status: COMPLETED | OUTPATIENT
Start: 2024-12-10 | End: 2024-12-10

## 2024-12-10 RX ORDER — HYDROXYZINE HYDROCHLORIDE 10 MG/1
10 TABLET, FILM COATED ORAL EVERY 4 HOURS PRN
Status: DISCONTINUED | OUTPATIENT
Start: 2024-12-10 | End: 2024-12-11

## 2024-12-10 RX ORDER — NALOXONE HCL 0.4 MG/ML
0.02 VIAL (ML) INJECTION
Status: DISCONTINUED | OUTPATIENT
Start: 2024-12-10 | End: 2024-12-15 | Stop reason: HOSPADM

## 2024-12-10 RX ORDER — SODIUM CHLORIDE 0.9 % (FLUSH) 0.9 %
10 SYRINGE (ML) INJECTION EVERY 12 HOURS PRN
Status: DISCONTINUED | OUTPATIENT
Start: 2024-12-10 | End: 2024-12-15 | Stop reason: HOSPADM

## 2024-12-10 RX ORDER — DIAZEPAM 5 MG/1
5 TABLET ORAL EVERY 8 HOURS
Status: DISCONTINUED | OUTPATIENT
Start: 2024-12-10 | End: 2024-12-11

## 2024-12-10 RX ORDER — PANTOPRAZOLE SODIUM 40 MG/10ML
40 INJECTION, POWDER, LYOPHILIZED, FOR SOLUTION INTRAVENOUS 2 TIMES DAILY
Status: DISCONTINUED | OUTPATIENT
Start: 2024-12-10 | End: 2024-12-10

## 2024-12-10 RX ORDER — IBUPROFEN 200 MG
24 TABLET ORAL
Status: DISCONTINUED | OUTPATIENT
Start: 2024-12-10 | End: 2024-12-15 | Stop reason: HOSPADM

## 2024-12-10 RX ORDER — THIAMINE HYDROCHLORIDE 100 MG/ML
100 INJECTION, SOLUTION INTRAMUSCULAR; INTRAVENOUS
Status: COMPLETED | OUTPATIENT
Start: 2024-12-10 | End: 2024-12-10

## 2024-12-10 RX ORDER — THIAMINE HCL 100 MG
100 TABLET ORAL DAILY
Status: DISCONTINUED | OUTPATIENT
Start: 2024-12-11 | End: 2024-12-15 | Stop reason: HOSPADM

## 2024-12-10 RX ORDER — DIAZEPAM 10 MG/2ML
5 INJECTION INTRAMUSCULAR EVERY 4 HOURS PRN
Status: DISCONTINUED | OUTPATIENT
Start: 2024-12-10 | End: 2024-12-11

## 2024-12-10 RX ORDER — PANTOPRAZOLE SODIUM 40 MG/1
40 TABLET, DELAYED RELEASE ORAL DAILY
Status: DISCONTINUED | OUTPATIENT
Start: 2024-12-11 | End: 2024-12-15 | Stop reason: HOSPADM

## 2024-12-10 RX ORDER — LORAZEPAM 2 MG/ML
0.5 INJECTION INTRAMUSCULAR
Status: COMPLETED | OUTPATIENT
Start: 2024-12-10 | End: 2024-12-10

## 2024-12-10 RX ORDER — FOLIC ACID 1 MG/1
1 TABLET ORAL DAILY
Status: DISCONTINUED | OUTPATIENT
Start: 2024-12-11 | End: 2024-12-15 | Stop reason: HOSPADM

## 2024-12-10 RX ORDER — IBUPROFEN 200 MG
16 TABLET ORAL
Status: DISCONTINUED | OUTPATIENT
Start: 2024-12-10 | End: 2024-12-15 | Stop reason: HOSPADM

## 2024-12-10 RX ADMIN — DIAZEPAM 5 MG: 5 TABLET ORAL at 09:12

## 2024-12-10 RX ADMIN — ONDANSETRON 4 MG: 4 TABLET, ORALLY DISINTEGRATING ORAL at 02:12

## 2024-12-10 RX ADMIN — ACETAMINOPHEN 650 MG: 325 TABLET ORAL at 09:12

## 2024-12-10 RX ADMIN — SODIUM CHLORIDE 1000 ML: 9 INJECTION, SOLUTION INTRAVENOUS at 12:12

## 2024-12-10 RX ADMIN — LORAZEPAM 0.5 MG: 2 INJECTION INTRAMUSCULAR; INTRAVENOUS at 01:12

## 2024-12-10 RX ADMIN — THIAMINE HYDROCHLORIDE 100 MG: 100 INJECTION, SOLUTION INTRAMUSCULAR; INTRAVENOUS at 02:12

## 2024-12-10 RX ADMIN — PANTOPRAZOLE SODIUM 80 MG: 40 INJECTION, POWDER, FOR SOLUTION INTRAVENOUS at 01:12

## 2024-12-10 RX ADMIN — DIAZEPAM 5 MG: 5 INJECTION, SOLUTION INTRAMUSCULAR; INTRAVENOUS at 03:12

## 2024-12-10 NOTE — HPI
This is a 39 year old lady with anxiety/depression, alcohol use disorder, and history of alcohol withdrawals with DT and seizures who presents with hematemesis and concerns for a seizure. Patient states that she had dark red emesis earlier this morning of one episode. She also states that she had some shakes and concerns for a seizure. She states her last drink was 2 beers about a week ago and denies any other drugs or alcohol since then. Patient states that she is trying to quit and has had intermittent shaking episodes since stopping. She does complain of chest pain that goes from her throat to her abdomen and shortness of breath when she is vomiting. She denies any abdominal pain, fevers, chills. She states that she had one episode of emesis here but it was clear. She denies any history of varices or cirrhosis in the past.     Upon chart review, noted to have multiple admissions for alcohol withdrawals. She was not amenable to treatment then but is now open for outpatient treatment.    In the ED, patient was VSS. Hgb noted to be 11 from baseline of 14 from a couple of days ago. She also was noted to have an elevated alcohol level to 300s. Patient was admitted to hospital medicine for further management.

## 2024-12-10 NOTE — ED NOTES
Pt. Father approached triage desk and states he believed pt. Is having focal seizure. Pt. Not responding to questions while sitting in chair. Pt. Arms and neck stiff. Pt. Assisted to wheelchair. Charge nurse notified and pt. Wheeled to room 6.

## 2024-12-10 NOTE — H&P
"  [facility] - Emergency Dept  Hospital Medicine  History & Physical    Patient Name: Verónica Piña  MRN: [id number]  Patient Class: IP- Inpatient  Admission Date: 12/10/2024  Attending Physician: David Harrison MD   Primary Care Provider: Juan Barahona II, MD         Patient information was obtained from patient, relative(s), past medical records, and ER records.     Subjective:     Principal Problem:Alcohol withdrawal syndrome with complication    Chief Complaint:   Chief Complaint   Patient presents with    Hematemesis     Pt. Report vomiting bright red blood and having dark stools last night. Pt. Family member also reporting "mini seizures, lasting approximately 1 min"        HPI: This is a 39 year old lady with anxiety/depression, alcohol use disorder, and history of alcohol withdrawals with DT and seizures who presents with hematemesis and concerns for a seizure. Patient states that she had dark red emesis earlier this morning of one episode. She also states that she had some shakes and concerns for a seizure. She states her last drink was 2 beers about a week ago and denies any other drugs or alcohol since then. Patient states that she is trying to quit and has had intermittent shaking episodes since stopping. She does complain of chest pain that goes from her throat to her abdomen and shortness of breath when she is vomiting. She denies any abdominal pain, fevers, chills. She states that she had one episode of emesis here but it was clear. She denies any history of varices or cirrhosis in the past.     Upon chart review, noted to have multiple admissions for alcohol withdrawals. She was not amenable to treatment then but is now open for outpatient treatment.    In the ED, patient was VSS. Hgb noted to be 11 from baseline of 14 from a couple of days ago. She also was noted to have an elevated alcohol level to 300s. Patient was admitted to hospital medicine for further management.    Past Medical " History:   Diagnosis Date    Anxiety     Depression     IUD (intrauterine device) in place        Past Surgical History:   Procedure Laterality Date    Removal of Mirena IUD      TONSILLECTOMY         Review of patient's allergies indicates:  No Known Allergies    No current facility-administered medications on file prior to encounter.     Current Outpatient Medications on File Prior to Encounter   Medication Sig    albuterol (PROVENTIL HFA) 90 mcg/actuation inhaler Inhale 2 puffs into the lungs every 6 (six) hours as needed for Wheezing. Rescue    budesonide-formoterol 80-4.5 mcg (SYMBICORT) 80-4.5 mcg/actuation HFAA Inhale 2 puffs into the lungs once daily. Controller (Patient not taking: Reported on 11/11/2024)    hydrOXYzine HCL (ATARAX) 10 MG Tab Take 1-2 tablets (10-20 mg total) by mouth every 4 (four) hours as needed (anxiety).    ibuprofen (ADVIL,MOTRIN) 800 MG tablet 1 tablet with food or milk as needed Orally as needed (Patient not taking: Reported on 11/11/2024)    levonorgestrel (MIRENA) 20 mcg/24 hr (5 years) IUD 1 Intra Uterine Device by Intrauterine route once.    metoprolol tartrate (LOPRESSOR) 25 MG tablet 1 tablet with food Orally Twice a day (Patient not taking: Reported on 11/11/2024)    paroxetine (PAXIL) 20 MG tablet Take 1 tablet (20 mg total) by mouth every morning.     Family History       Problem Relation (Age of Onset)    Breast cancer Maternal Grandmother    Cerebral aneurysm Mother    No Known Problems Father          Tobacco Use    Smoking status: Never    Smokeless tobacco: Never   Substance and Sexual Activity    Alcohol use: Yes     Alcohol/week: 0.0 standard drinks of alcohol     Comment: occasionally    Drug use: Not Currently    Sexual activity: Not Currently     Partners: Male     Birth control/protection: I.U.D.     Review of Systems   Constitutional:  Negative for chills and fever.   HENT:  Negative for ear pain and sinus pain.    Eyes:  Negative for pain.   Respiratory:   Positive for shortness of breath. Negative for cough.    Cardiovascular:  Positive for chest pain. Negative for leg swelling.   Gastrointestinal:  Positive for nausea and vomiting. Negative for abdominal pain, constipation, diarrhea and rectal pain.   Genitourinary:  Negative for dysuria and flank pain.   Musculoskeletal:  Negative for arthralgias, back pain, joint swelling, myalgias and neck pain.   Neurological:  Negative for weakness, numbness and headaches.   Psychiatric/Behavioral:  Negative for agitation, dysphoric mood and sleep disturbance. The patient is not nervous/anxious.      Objective:     Vital Signs (Most Recent):  Temp: 98.2 °F (36.8 °C) (12/10/24 1128)  Pulse: 105 (12/10/24 1128)  Resp: 18 (12/10/24 1128)  BP: 117/64 (12/10/24 1128)  SpO2: 97 % (12/10/24 1128) Vital Signs (24h Range):  Temp:  [98.2 °F (36.8 °C)] 98.2 °F (36.8 °C)  Pulse:  [105] 105  Resp:  [18] 18  SpO2:  [97 %] 97 %  BP: (117)/(64) 117/64     Weight: 70.5 kg (155 lb 6.7 oz)  Body mass index is 25.86 kg/m².     Physical Exam  Vitals and nursing note reviewed.   Constitutional:       General: She is not in acute distress.     Appearance: Normal appearance. She is not ill-appearing, toxic-appearing or diaphoretic.   HENT:      Head: Normocephalic and atraumatic.      Right Ear: External ear normal.      Left Ear: External ear normal.      Nose: Nose normal.      Mouth/Throat:      Mouth: Mucous membranes are moist.   Eyes:      General: No scleral icterus.        Right eye: No discharge.         Left eye: No discharge.      Extraocular Movements: Extraocular movements intact.   Cardiovascular:      Rate and Rhythm: Tachycardia present.      Heart sounds: Normal heart sounds. No murmur heard.     No friction rub.   Pulmonary:      Effort: Pulmonary effort is normal. No respiratory distress.      Breath sounds: Normal breath sounds. No stridor. No wheezing, rhonchi or rales.   Chest:      Chest wall: Tenderness present.   Abdominal:       General: Abdomen is flat. Bowel sounds are normal. There is no distension.      Tenderness: There is abdominal tenderness.   Musculoskeletal:         General: No swelling.      Cervical back: Normal range of motion.      Right lower leg: No edema.      Left lower leg: No edema.   Skin:     General: Skin is warm and dry.      Capillary Refill: Capillary refill takes less than 2 seconds.      Coloration: Skin is not jaundiced or pale.      Findings: No bruising.   Neurological:      General: No focal deficit present.      Mental Status: She is alert and oriented to person, place, and time.      Motor: No weakness.   Psychiatric:         Mood and Affect: Mood normal.         Behavior: Behavior normal.         Thought Content: Thought content normal.                Significant Labs: All pertinent labs within the past 24 hours have been reviewed.    Significant Imaging: I have reviewed all pertinent imaging results/findings within the past 24 hours.  Assessment/Plan:     * Alcohol withdrawal syndrome with complication  39 yoF with history of alcohol use disorder and prior history of alcohol withdrawal presenting with concerns for alcohol withdrawal. High risk with seizures, will schedule taper.     - Vitals q4h  - CIWA monitoring  - Diazepam 5mg q8   - Diazepam 5mg if 2 criteria are met: Systolic BP>160, Diastolic BP >110 or Pulse >110  - continue Vitamin supplementation- Thiamine, Folic acid, Vit. B12, and Multivitamin qd        Acute blood loss anemia  Anemia is likely due to acute blood loss which was from Gastritis . Most recent hemoglobin and hematocrit are listed below.  Recent Labs     12/10/24  1240   HGB 11.5*   HCT 34.5*     Plan  - Monitor serial CBC: Every 12 hours  - Transfuse PRBC if patient becomes hemodynamically unstable, symptomatic or H/H drops below 7/21.  - Patient has not received any PRBC transfusions to date  - Patient's anemia is currently stable    Pericardial cyst  Noted on CT chest 11/13.  Asymptomatic     - will f/u outpatient for monitoring and management      Upper GI bleed  Patient's hemorrhage is due to gastrointestinal bleed, patient does not have a propensity for bleeding.. Patients most recent Hgb, Hct, platelets, and INR are listed below.  Recent Labs     12/10/24  1240   HGB 11.5*   HCT 34.5*   *   INR 1.0       Plan  - Will trend hemoglobin/hematocrit Every 12 hours  - Will monitor and correct any coagulation defects  - Will transfuse if Hgb is <7g/dl (<8g/dl in cases of active ACS) or if patient has rapid bleeding leading to hemodynamic instability  - Stable now, no evidence of ongoing hematemesis currently. Will hold off on GI consult but low threshold to consult if patient continues to have s/s of bleeding.     Generalized anxiety disorder  Continue home hydroxyzine and paxil        VTE Risk Mitigation (From admission, onward)           Ordered     IP VTE LOW RISK PATIENT  Once         12/10/24 1533     Place sequential compression device  Until discontinued         12/10/24 1533                                    David Harrison MD  Department of Hospital Medicine  [facility] - Emergency Dept

## 2024-12-10 NOTE — ED TRIAGE NOTES
Patient arrived to ER via personal transport for the chief complaint of multiple complaints. The patient reports vomiting bright red blood and also endorses having dark stools last night. The patient's family members are also endorsing the patient having mini seizures lasting around one minute, patient family describes it as absence seizures.

## 2024-12-10 NOTE — ASSESSMENT & PLAN NOTE
Patient's hemorrhage is due to gastrointestinal bleed, patient does not have a propensity for bleeding.. Patients most recent Hgb, Hct, platelets, and INR are listed below.  Recent Labs     12/10/24  1240   HGB 11.5*   HCT 34.5*   *   INR 1.0       Plan  - Will trend hemoglobin/hematocrit Every 12 hours  - Will monitor and correct any coagulation defects  - Will transfuse if Hgb is <7g/dl (<8g/dl in cases of active ACS) or if patient has rapid bleeding leading to hemodynamic instability  - Stable now, no evidence of ongoing hematemesis currently. Will hold off on GI consult but low threshold to consult if patient continues to have s/s of bleeding.

## 2024-12-10 NOTE — SUBJECTIVE & OBJECTIVE
Past Medical History:   Diagnosis Date    Anxiety     Depression     IUD (intrauterine device) in place        Past Surgical History:   Procedure Laterality Date    Removal of Mirena IUD      TONSILLECTOMY         Review of patient's allergies indicates:  No Known Allergies    No current facility-administered medications on file prior to encounter.     Current Outpatient Medications on File Prior to Encounter   Medication Sig    albuterol (PROVENTIL HFA) 90 mcg/actuation inhaler Inhale 2 puffs into the lungs every 6 (six) hours as needed for Wheezing. Rescue    budesonide-formoterol 80-4.5 mcg (SYMBICORT) 80-4.5 mcg/actuation HFAA Inhale 2 puffs into the lungs once daily. Controller (Patient not taking: Reported on 11/11/2024)    hydrOXYzine HCL (ATARAX) 10 MG Tab Take 1-2 tablets (10-20 mg total) by mouth every 4 (four) hours as needed (anxiety).    ibuprofen (ADVIL,MOTRIN) 800 MG tablet 1 tablet with food or milk as needed Orally as needed (Patient not taking: Reported on 11/11/2024)    levonorgestrel (MIRENA) 20 mcg/24 hr (5 years) IUD 1 Intra Uterine Device by Intrauterine route once.    metoprolol tartrate (LOPRESSOR) 25 MG tablet 1 tablet with food Orally Twice a day (Patient not taking: Reported on 11/11/2024)    paroxetine (PAXIL) 20 MG tablet Take 1 tablet (20 mg total) by mouth every morning.     Family History       Problem Relation (Age of Onset)    Breast cancer Maternal Grandmother    Cerebral aneurysm Mother    No Known Problems Father          Tobacco Use    Smoking status: Never    Smokeless tobacco: Never   Substance and Sexual Activity    Alcohol use: Yes     Alcohol/week: 0.0 standard drinks of alcohol     Comment: occasionally    Drug use: Not Currently    Sexual activity: Not Currently     Partners: Male     Birth control/protection: I.U.D.     Review of Systems   Constitutional:  Negative for chills and fever.   HENT:  Negative for ear pain and sinus pain.    Eyes:  Negative for pain.    Respiratory:  Positive for shortness of breath. Negative for cough.    Cardiovascular:  Positive for chest pain. Negative for leg swelling.   Gastrointestinal:  Positive for nausea and vomiting. Negative for abdominal pain, constipation, diarrhea and rectal pain.   Genitourinary:  Negative for dysuria and flank pain.   Musculoskeletal:  Negative for arthralgias, back pain, joint swelling, myalgias and neck pain.   Neurological:  Negative for weakness, numbness and headaches.   Psychiatric/Behavioral:  Negative for agitation, dysphoric mood and sleep disturbance. The patient is not nervous/anxious.      Objective:     Vital Signs (Most Recent):  Temp: 98.2 °F (36.8 °C) (12/10/24 1128)  Pulse: 105 (12/10/24 1128)  Resp: 18 (12/10/24 1128)  BP: 117/64 (12/10/24 1128)  SpO2: 97 % (12/10/24 1128) Vital Signs (24h Range):  Temp:  [98.2 °F (36.8 °C)] 98.2 °F (36.8 °C)  Pulse:  [105] 105  Resp:  [18] 18  SpO2:  [97 %] 97 %  BP: (117)/(64) 117/64     Weight: 70.5 kg (155 lb 6.7 oz)  Body mass index is 25.86 kg/m².     Physical Exam  Vitals and nursing note reviewed.   Constitutional:       General: She is not in acute distress.     Appearance: Normal appearance. She is not ill-appearing, toxic-appearing or diaphoretic.   HENT:      Head: Normocephalic and atraumatic.      Right Ear: External ear normal.      Left Ear: External ear normal.      Nose: Nose normal.      Mouth/Throat:      Mouth: Mucous membranes are moist.   Eyes:      General: No scleral icterus.        Right eye: No discharge.         Left eye: No discharge.      Extraocular Movements: Extraocular movements intact.   Cardiovascular:      Rate and Rhythm: Tachycardia present.      Heart sounds: Normal heart sounds. No murmur heard.     No friction rub.   Pulmonary:      Effort: Pulmonary effort is normal. No respiratory distress.      Breath sounds: Normal breath sounds. No stridor. No wheezing, rhonchi or rales.   Chest:      Chest wall: Tenderness present.    Abdominal:      General: Abdomen is flat. Bowel sounds are normal. There is no distension.      Tenderness: There is abdominal tenderness.   Musculoskeletal:         General: No swelling.      Cervical back: Normal range of motion.      Right lower leg: No edema.      Left lower leg: No edema.   Skin:     General: Skin is warm and dry.      Capillary Refill: Capillary refill takes less than 2 seconds.      Coloration: Skin is not jaundiced or pale.      Findings: No bruising.   Neurological:      General: No focal deficit present.      Mental Status: She is alert and oriented to person, place, and time.      Motor: No weakness.   Psychiatric:         Mood and Affect: Mood normal.         Behavior: Behavior normal.         Thought Content: Thought content normal.                Significant Labs: All pertinent labs within the past 24 hours have been reviewed.    Significant Imaging: I have reviewed all pertinent imaging results/findings within the past 24 hours.

## 2024-12-10 NOTE — ASSESSMENT & PLAN NOTE
39 yoF with history of alcohol use disorder and prior history of alcohol withdrawal presenting with concerns for alcohol withdrawal. High risk with seizures, will schedule taper.     - Vitals q4h  - CIWA monitoring  - Diazepam 5mg q8   - Diazepam 5mg if 2 criteria are met: Systolic BP>160, Diastolic BP >110 or Pulse >110  - continue Vitamin supplementation- Thiamine, Folic acid, Vit. B12, and Multivitamin qd

## 2024-12-10 NOTE — ED PROVIDER NOTES
"Encounter Date: 12/10/2024       History     Chief Complaint   Patient presents with    Hematemesis     Pt. Report vomiting bright red blood and having dark stools last night. Pt. Family member also reporting "mini seizures, lasting approximately 1 min"     HPI    Verónica Piña is a 39 y.o. female medical history significant for anxiety, depression, asthma, umbilical hernia, alcohol use disorder, alcohol withdrawal complicated by seizures.  Patient presents to the ED today w/ concerns of hematemesis, melena, and seizures.  Patient states that about 5 weeks ago, she stopped drinking alcohol but did have 2 beers last week; she states she has not had any drugs or alcohol since that time. Patient states that this morning she had a few episodes of nausea w/ mc red vomiting as well as dark/tarry stools - patient provided pictures which corresponded w/ her statement.  Patient is accompanied by the seizures/Sz-like activity who describes these episodes as lasting for about 1 minute where patient becomes unresponsive and has questionable tonic/clonic like activity w/ a postictal state.  Patient states that during these events, she has no recollection of the event and does feel somewhat confused after these events.  Patient denies headaches, vision changes, confusion, f/c, CP, SOB, abdominal pain, dysuria/hematuria, lower extremity edema/erythema.    Review of patient's allergies indicates:  No Known Allergies  Past Medical History:   Diagnosis Date    Anxiety     Depression     IUD (intrauterine device) in place      Past Surgical History:   Procedure Laterality Date    Removal of Mirena IUD      TONSILLECTOMY       Family History   Problem Relation Name Age of Onset    Cerebral aneurysm Mother      No Known Problems Father      Breast cancer Maternal Grandmother       Social History     Tobacco Use    Smoking status: Never    Smokeless tobacco: Never   Substance Use Topics    Alcohol use: Yes     Alcohol/week: " 0.0 standard drinks of alcohol     Comment: occasionally    Drug use: Not Currently     Review of Systems    Physical Exam     Initial Vitals [12/10/24 1128]   BP Pulse Resp Temp SpO2   117/64 105 18 98.2 °F (36.8 °C) 97 %      MAP       --         Physical Exam    Nursing note and vitals reviewed.  Constitutional: She appears well-developed and well-nourished. No distress.   HENT:   Head: Normocephalic and atraumatic.   Nose: Nose normal.   Eyes: Conjunctivae and EOM are normal.   Neck: Neck supple.   Normal range of motion.  Cardiovascular:  Normal rate, normal heart sounds and intact distal pulses.           Pulmonary/Chest: Breath sounds normal. No respiratory distress.   Abdominal: Abdomen is soft. Bowel sounds are normal. She exhibits no distension. There is no abdominal tenderness.   Musculoskeletal:         General: No tenderness or edema. Normal range of motion.      Cervical back: Normal range of motion and neck supple.     Neurological: She is alert and oriented to person, place, and time. She has normal strength. She displays tremor. No cranial nerve deficit or sensory deficit. She exhibits normal muscle tone. She displays no seizure activity. Gait normal. GCS eye subscore is 4. GCS verbal subscore is 5. GCS motor subscore is 6.   Skin: Skin is warm and dry. Capillary refill takes less than 2 seconds.   Psychiatric: She has a normal mood and affect. Her behavior is normal.         ED Course   Procedures  Labs Reviewed   CBC W/ AUTO DIFFERENTIAL - Abnormal       Result Value    WBC 7.40      RBC 3.54 (*)     Hemoglobin 11.5 (*)     Hematocrit 34.5 (*)     MCV 98      MCH 32.5 (*)     MCHC 33.3      RDW 14.7 (*)     Platelets 111 (*)     MPV 9.9      Immature Granulocytes 0.3      Gran # (ANC) 5.5      Immature Grans (Abs) 0.02      Lymph # 1.0      Mono # 0.7      Eos # 0.1      Baso # 0.05      nRBC 0      Gran % 74.5 (*)     Lymph % 13.4 (*)     Mono % 9.3      Eosinophil % 1.8      Basophil % 0.7       Differential Method Automated     COMPREHENSIVE METABOLIC PANEL - Abnormal    Sodium 142      Potassium 3.9      Chloride 106      CO2 22 (*)     Glucose 96      BUN 7      Creatinine 0.7      Calcium 9.0      Total Protein 7.1      Albumin 3.8      Total Bilirubin 0.3      Alkaline Phosphatase 98      AST 81 (*)     ALT 49 (*)     eGFR >60.0      Anion Gap 14     URINALYSIS, REFLEX TO URINE CULTURE - Abnormal    Specimen UA Urine, Clean Catch      Color, UA Colorless (*)     Appearance, UA Clear      pH, UA 6.0      Specific Gravity, UA 1.005      Protein, UA Negative      Glucose, UA Negative      Ketones, UA Negative      Bilirubin (UA) Negative      Occult Blood UA Negative      Nitrite, UA Negative      Leukocytes, UA Negative      Narrative:     Specimen Source->Urine   TOXICOLOGY SCREEN, URINE, RANDOM (COMPLIANCE) - Abnormal    Alcohol, Urine 308 (*)     Benzodiazepines Negative      Methadone metabolites Negative      Cocaine (Metab.) Negative      Opiate Scrn, Ur Negative      Barbiturate Screen, Ur Negative      Amphetamine Screen, Ur Negative      THC Negative      Phencyclidine Negative      Creatinine, Urine 46.0      Toxicology Information SEE COMMENT      Narrative:     Specimen Source->Urine   ALCOHOL,MEDICAL (ETHANOL) - Abnormal    Alcohol, Serum 208 (*)    HEPATITIS C ANTIBODY    Hepatitis C Ab Non-reactive      Narrative:     Release to patient->Immediate   HIV 1 / 2 ANTIBODY    HIV 1/2 Ag/Ab Non-reactive      Narrative:     Release to patient->Immediate   MAGNESIUM    Magnesium 1.9     PHOSPHORUS    Phosphorus 3.3     CK         PROTIME-INR    Prothrombin Time 10.9      INR 1.0     POCT URINE PREGNANCY    POC Preg Test, Ur Negative       Acceptable Yes     ISTAT LACTATE    POC Lactate 1.82      Sample VENOUS      Site Other      Allens Test N/A     POCT GLUCOSE MONITORING CONTINUOUS        ECG Results              EKG 12-lead (Final result)        Collection Time Result Time  QRS Duration [facility] QTC Calculation    12/10/24 14:58:21 12/10/24 15:14:14 80 436                     Final result by Interface, Lab In [facility] (12/10/24 15:14:18)                   Narrative:    Test Reason : R56.9,    Vent. Rate :  97 BPM     Atrial Rate :  97 BPM     P-R Int : 120 ms          QRS Dur :  80 ms      QT Int : 344 ms       P-R-T Axes :  71  70  66 degrees    QTcB Int : 436 ms    Normal sinus rhythm  Nonspecific ST and T wave abnormality  Abnormal ECG  When compared with ECG of 30-Aug-2024 15:03,  Nonspecific T wave abnormality now evident in Anterior leads  Confirmed by Axel Tamayo ([id number]) on 12/10/2024 3:14:11 PM    Referred By: AAAREFERRAL SELF           Confirmed By: Axel Tamayo                                  Imaging Results              CT Head Without Contrast (Final result)  Result time 12/10/24 13:55:34      Final result by Eric Herrera MD (12/10/24 13:55:34)                   Impression:      No evidence of acute hemorrhage or major vascular distribution infarct.      Electronically signed by: Eric Herrera MD  Date:    12/10/2024  Time:    13:55               Narrative:    EXAMINATION:  CT HEAD WITHOUT CONTRAST    CLINICAL HISTORY:  Seizure, new-onset, no history of trauma;    TECHNIQUE:  Low dose axial CT images obtained throughout the head without the use of intravenous contrast.  Axial, sagittal and coronal reconstructions were performed.    COMPARISON:  None.    FINDINGS:  Intracranial compartment:    Ventricles and sulci are normal in size for age without evidence of hydrocephalus.    The brain parenchyma appears within normal limits.  No parenchymal  hemorrhage, edema, mass effect or major vascular distribution infarct.    No extra-axial blood or fluid collections.    Skull/extracranial contents (limited evaluation):    No displaced calvarial fracture.    The mastoid air cells and visualized paranasal sinuses are essentially clear.                                       X-Ray Chest  AP Portable (Final result)  Result time 12/10/24 13:22:42      Final result by Maykel Lazcano MD (12/10/24 13:22:42)                   Impression:      No acute chest disease identified.      Electronically signed by: Maykel Lazcano MD  Date:    12/10/2024  Time:    13:22               Narrative:    EXAMINATION:  XR CHEST AP PORTABLE    CLINICAL HISTORY:  hematemesis r/o free air;    TECHNIQUE:  Single frontal view of the chest was performed.    COMPARISON:  08/31/2024.    FINDINGS:  The heart is not enlarged.  Superior mediastinal structures are unremarkable.  Pulmonary vasculature is within normal limits.  The lungs are free of focal consolidations.  There is no evidence for pneumothorax or large pleural effusions.  Bony structures are grossly intact.                                       Medications   sodium chloride 0.9% flush 10 mL (has no administration in time range)   naloxone 0.4 mg/mL injection 0.02 mg (has no administration in time range)   glucose chewable tablet 16 g (has no administration in time range)   glucose chewable tablet 24 g (has no administration in time range)   glucagon (human recombinant) injection 1 mg (has no administration in time range)   dextrose 10% bolus 125 mL 125 mL (has no administration in time range)   dextrose 10% bolus 250 mL 250 mL (has no administration in time range)   hydrOXYzine HCL tablet 10 mg (has no administration in time range)   paroxetine tablet 20 mg (has no administration in time range)   diazePAM tablet 10 mg (has no administration in time range)   thiamine tablet 100 mg (has no administration in time range)   multivitamin tablet (has no administration in time range)   folic acid tablet 1 mg (has no administration in time range)   pantoprazole injection 80 mg (80 mg Intravenous Given 12/10/24 1300)   sodium chloride 0.9% bolus 1,000 mL 1,000 mL (0 mLs Intravenous Stopped 12/10/24 1354)   LORazepam injection 0.5 mg (0.5 mg Intravenous Given 12/10/24 1300)    thiamine injection 100 mg (100 mg Intravenous Given 12/10/24 1452)   ondansetron disintegrating tablet 4 mg (4 mg Oral Given 12/10/24 1454)   diazePAM injection 5 mg (5 mg Intravenous Given 12/10/24 1501)     Medical Decision Making  Verónica Piña is a 39 y.o. female who presents to the emergency department for an episode of hematemesis as well as 3 seizures occurring at home witnessed by her family. On initial evaluation, VSS and EKG shows no evidence of myocardial ischemia.  On exam, patient appears tremulous, but neuro exam otherwise WNL.  No other obvious abnl findings on exam.  We opted to give patient had an Ativan 0.5 mg and thiamine at this time for concern of withdrawal given her tremulousness and known alcohol use disorder and history of complicated withdrawal.  On re-evaluation, patient is less tremulous and clinical status is otherwise largely unchanged.  Patient did have 1 episode of nonbloody/nonbilious vomiting.  Following lab results showing elevated serum EtOH, I spoke w/ the patient about her recent alcohol use.  She states that she had a little bit to drink last night, but has had none today.  Patient was given diazepam 5 mg and Zofran at this time.     Pertinent Labs:  CBC w/ evidence of mild anemia.  No leukocytosis.  CMP w/elevations in AST and ALT consistent w/known hepatic injury.  No gross metabolic derangements.  CPK and lactate WNL, reducing concern for prolonged Sz.  Serum EtOH and serum tox screen show up trending blood EtOH levels concerning for recent ingestion.  Otherwise, no evidence of additional drug use.    Pertinent Imaging:  CT head w/o evidence of acute intracranial pathology.     Ddx including, but not limited to:  Esophageal stricture v. esophageal erosion v. variceal bleeding v. esophageal tear v. gastritis v. primary Sz disorder v. alcohol withdrawal v. ICH v. stroke v. cortical malformation v. intracranial mass     Most likely Dx:  At this time, I have highest  suspicion for esophageal erosion leading to GI bleeding and subsequent hematemesis/melena; it seems as though this is self-limited as patient had an episode of vomiting in the ED w/o blood.  I suspect that patient also has acute alcohol intoxication and is likely experiencing early stages of alcohol withdrawal given physical exam and seizures.     Disposition:   Patient will be admitted to Hospital Medicine. Discussed reason for admission and plan with patient and/or caregiver who expressed understanding and agreement.    Please see HPI, physical exam, ED course for additional details.    Amount and/or Complexity of Data Reviewed  Labs: ordered. Decision-making details documented in ED Course.  Radiology: ordered.    Risk  Prescription drug management.  Decision regarding hospitalization.               ED Course as of 12/10/24 1638   Tue Dec 10, 2024   1351 Alcohol, Serum(!): 208 [AC]      ED Course User Index  [AC] Eddie Zarco DO                           Clinical Impression:  Final diagnoses:  [R56.9] Seizure  [K92.0] Hematemesis, unspecified whether nausea present (Primary)  [K92.1] Melena  [F10.90] Alcohol use disorder          ED Disposition Condition    Admit                 Donell Jett MD  Resident  12/10/24 [id number]

## 2024-12-11 LAB
ALBUMIN SERPL BCP-MCNC: 3.4 G/DL (ref 3.5–5.2)
ALP SERPL-CCNC: 86 U/L (ref 40–150)
ALT SERPL W/O P-5'-P-CCNC: 41 U/L (ref 10–44)
ANION GAP SERPL CALC-SCNC: 13 MMOL/L (ref 8–16)
AST SERPL-CCNC: 58 U/L (ref 10–40)
BASOPHILS # BLD AUTO: 0.07 K/UL (ref 0–0.2)
BASOPHILS # BLD AUTO: 0.08 K/UL (ref 0–0.2)
BASOPHILS NFR BLD: 1.3 % (ref 0–1.9)
BASOPHILS NFR BLD: 1.4 % (ref 0–1.9)
BILIRUB SERPL-MCNC: 0.5 MG/DL (ref 0.1–1)
BUN SERPL-MCNC: 7 MG/DL (ref 6–20)
CALCIUM SERPL-MCNC: 8.8 MG/DL (ref 8.7–10.5)
CHLORIDE SERPL-SCNC: 106 MMOL/L (ref 95–110)
CO2 SERPL-SCNC: 20 MMOL/L (ref 23–29)
CREAT SERPL-MCNC: 0.7 MG/DL (ref 0.5–1.4)
DIFFERENTIAL METHOD BLD: ABNORMAL
DIFFERENTIAL METHOD BLD: ABNORMAL
EOSINOPHIL # BLD AUTO: 0.1 K/UL (ref 0–0.5)
EOSINOPHIL # BLD AUTO: 0.1 K/UL (ref 0–0.5)
EOSINOPHIL NFR BLD: 1.6 % (ref 0–8)
EOSINOPHIL NFR BLD: 2.5 % (ref 0–8)
ERYTHROCYTE [DISTWIDTH] IN BLOOD BY AUTOMATED COUNT: 14.2 % (ref 11.5–14.5)
ERYTHROCYTE [DISTWIDTH] IN BLOOD BY AUTOMATED COUNT: 14.4 % (ref 11.5–14.5)
EST. GFR  (NO RACE VARIABLE): >60 ML/MIN/1.73 M^2
GLUCOSE SERPL-MCNC: 75 MG/DL (ref 70–110)
HCT VFR BLD AUTO: 31.8 % (ref 37–48.5)
HCT VFR BLD AUTO: 32.7 % (ref 37–48.5)
HGB BLD-MCNC: 10.8 G/DL (ref 12–16)
HGB BLD-MCNC: 11.1 G/DL (ref 12–16)
IMM GRANULOCYTES # BLD AUTO: 0.02 K/UL (ref 0–0.04)
IMM GRANULOCYTES # BLD AUTO: 0.02 K/UL (ref 0–0.04)
IMM GRANULOCYTES NFR BLD AUTO: 0.4 % (ref 0–0.5)
IMM GRANULOCYTES NFR BLD AUTO: 0.4 % (ref 0–0.5)
LYMPHOCYTES # BLD AUTO: 0.8 K/UL (ref 1–4.8)
LYMPHOCYTES # BLD AUTO: 1.1 K/UL (ref 1–4.8)
LYMPHOCYTES NFR BLD: 14 % (ref 18–48)
LYMPHOCYTES NFR BLD: 20 % (ref 18–48)
MAGNESIUM SERPL-MCNC: 1.7 MG/DL (ref 1.6–2.6)
MCH RBC QN AUTO: 32.2 PG (ref 27–31)
MCH RBC QN AUTO: 33.4 PG (ref 27–31)
MCHC RBC AUTO-ENTMCNC: 33.9 G/DL (ref 32–36)
MCHC RBC AUTO-ENTMCNC: 34 G/DL (ref 32–36)
MCV RBC AUTO: 95 FL (ref 82–98)
MCV RBC AUTO: 99 FL (ref 82–98)
MONOCYTES # BLD AUTO: 0.6 K/UL (ref 0.3–1)
MONOCYTES # BLD AUTO: 0.8 K/UL (ref 0.3–1)
MONOCYTES NFR BLD: 10.6 % (ref 4–15)
MONOCYTES NFR BLD: 14.4 % (ref 4–15)
NEUTROPHILS # BLD AUTO: 3.4 K/UL (ref 1.8–7.7)
NEUTROPHILS # BLD AUTO: 4.1 K/UL (ref 1.8–7.7)
NEUTROPHILS NFR BLD: 61.4 % (ref 38–73)
NEUTROPHILS NFR BLD: 72 % (ref 38–73)
NRBC BLD-RTO: 0 /100 WBC
NRBC BLD-RTO: 0 /100 WBC
PHOSPHATE SERPL-MCNC: 3.6 MG/DL (ref 2.7–4.5)
PLATELET # BLD AUTO: 107 K/UL (ref 150–450)
PLATELET # BLD AUTO: 112 K/UL (ref 150–450)
PMV BLD AUTO: 10.1 FL (ref 9.2–12.9)
PMV BLD AUTO: 9.6 FL (ref 9.2–12.9)
POTASSIUM SERPL-SCNC: 4.1 MMOL/L (ref 3.5–5.1)
PROT SERPL-MCNC: 6.3 G/DL (ref 6–8.4)
RBC # BLD AUTO: 3.32 M/UL (ref 4–5.4)
RBC # BLD AUTO: 3.35 M/UL (ref 4–5.4)
SODIUM SERPL-SCNC: 139 MMOL/L (ref 136–145)
WBC # BLD AUTO: 5.54 K/UL (ref 3.9–12.7)
WBC # BLD AUTO: 5.66 K/UL (ref 3.9–12.7)

## 2024-12-11 PROCEDURE — 85025 COMPLETE CBC W/AUTO DIFF WBC: CPT

## 2024-12-11 PROCEDURE — 36415 COLL VENOUS BLD VENIPUNCTURE: CPT

## 2024-12-11 PROCEDURE — 84100 ASSAY OF PHOSPHORUS: CPT

## 2024-12-11 PROCEDURE — 83735 ASSAY OF MAGNESIUM: CPT

## 2024-12-11 PROCEDURE — 25000003 PHARM REV CODE 250

## 2024-12-11 PROCEDURE — 80053 COMPREHEN METABOLIC PANEL: CPT

## 2024-12-11 PROCEDURE — 25000003 PHARM REV CODE 250: Performed by: STUDENT IN AN ORGANIZED HEALTH CARE EDUCATION/TRAINING PROGRAM

## 2024-12-11 PROCEDURE — 63600175 PHARM REV CODE 636 W HCPCS: Performed by: STUDENT IN AN ORGANIZED HEALTH CARE EDUCATION/TRAINING PROGRAM

## 2024-12-11 PROCEDURE — 99222 1ST HOSP IP/OBS MODERATE 55: CPT | Mod: AF,HB,, | Performed by: PSYCHIATRY & NEUROLOGY

## 2024-12-11 PROCEDURE — 63600175 PHARM REV CODE 636 W HCPCS

## 2024-12-11 PROCEDURE — 20600001 HC STEP DOWN PRIVATE ROOM

## 2024-12-11 RX ORDER — DIAZEPAM 5 MG/1
10 TABLET ORAL EVERY 8 HOURS
Status: DISCONTINUED | OUTPATIENT
Start: 2024-12-11 | End: 2024-12-13

## 2024-12-11 RX ORDER — DIAZEPAM 10 MG/2ML
10 INJECTION INTRAMUSCULAR EVERY 4 HOURS PRN
Status: DISCONTINUED | OUTPATIENT
Start: 2024-12-11 | End: 2024-12-15 | Stop reason: HOSPADM

## 2024-12-11 RX ORDER — HYDROXYZINE HYDROCHLORIDE 10 MG/1
10 TABLET, FILM COATED ORAL EVERY 4 HOURS PRN
Status: DISCONTINUED | OUTPATIENT
Start: 2024-12-11 | End: 2024-12-15 | Stop reason: HOSPADM

## 2024-12-11 RX ORDER — DIAZEPAM 10 MG/2ML
5 INJECTION INTRAMUSCULAR ONCE
Status: DISCONTINUED | OUTPATIENT
Start: 2024-12-11 | End: 2024-12-15 | Stop reason: HOSPADM

## 2024-12-11 RX ADMIN — DIAZEPAM 10 MG: 5 TABLET ORAL at 02:12

## 2024-12-11 RX ADMIN — DIAZEPAM 5 MG: 5 TABLET ORAL at 05:12

## 2024-12-11 RX ADMIN — PANTOPRAZOLE SODIUM 40 MG: 40 TABLET, DELAYED RELEASE ORAL at 08:12

## 2024-12-11 RX ADMIN — DIAZEPAM 10 MG: 5 TABLET ORAL at 10:12

## 2024-12-11 RX ADMIN — ONDANSETRON 4 MG: 4 TABLET, ORALLY DISINTEGRATING ORAL at 05:12

## 2024-12-11 RX ADMIN — ACETAMINOPHEN 650 MG: 325 TABLET ORAL at 07:12

## 2024-12-11 RX ADMIN — Medication 100 MG: at 08:12

## 2024-12-11 RX ADMIN — DIAZEPAM 5 MG: 10 INJECTION, SOLUTION INTRAMUSCULAR; INTRAVENOUS at 08:12

## 2024-12-11 RX ADMIN — THERA TABS 1 TABLET: TAB at 08:12

## 2024-12-11 RX ADMIN — SODIUM CHLORIDE, POTASSIUM CHLORIDE, SODIUM LACTATE AND CALCIUM CHLORIDE 1000 ML: 600; 310; 30; 20 INJECTION, SOLUTION INTRAVENOUS at 05:12

## 2024-12-11 RX ADMIN — FOLIC ACID 1 MG: 1 TABLET ORAL at 08:12

## 2024-12-11 RX ADMIN — PAROXETINE HYDROCHLORIDE 20 MG: 10 TABLET, FILM COATED ORAL at 08:12

## 2024-12-11 RX ADMIN — HYDROXYZINE HYDROCHLORIDE 10 MG: 10 TABLET ORAL at 05:12

## 2024-12-11 NOTE — ED NOTES
Telemetry Verification   Patient placed on Telemetry Box  Verified with War Room  Box #    Monitor Tech    Rate 101   Rhythm Sinus tach

## 2024-12-11 NOTE — PLAN OF CARE
CM provided patient a list of virtual AA meetings, Peer   that asists with housing, substance abuse training, mental health counseling, and employment training programs.       Met with patient  to review discharge recommendation of  intensive outpatient program  and is agreeable to plan    Patient/family provided list of facilities in-network with patient's payor plan. Providers that are owned, operated, or affiliated with [facility] are included on the list.     Notified that referral sent to below listed facilities from in-network list based on proximity to home/family support:   [facility] [phone]    Patient/family instructed to identify preference.    Preferred Facility: (if more than 1, listed in order of descending preference)  1.[facility] [phone]      If an additional preferred facility not listed above is identified, additional referral to be sent. If above facilities unable to accept, will send additional referrals to in-network providers.        Sandy Sherwood RN  Case Management  [facility]  [phone]

## 2024-12-11 NOTE — SUBJECTIVE & OBJECTIVE
Interval History: prn ativan given this AM for elevated CIWA. Otherwise NAEON and VSS. No more hematemesis noted    Review of Systems   Constitutional:  Negative for chills and fever.   HENT:  Negative for ear pain and sinus pain.    Eyes:  Negative for pain.   Respiratory:  Negative for cough and shortness of breath.    Cardiovascular:  Negative for chest pain and leg swelling.   Gastrointestinal:  Positive for abdominal pain and nausea. Negative for constipation, diarrhea, rectal pain and vomiting.   Genitourinary:  Negative for dysuria and flank pain.   Musculoskeletal:  Negative for arthralgias, back pain, joint swelling, myalgias and neck pain.   Neurological:  Negative for weakness, numbness and headaches.   Psychiatric/Behavioral:  Negative for agitation, dysphoric mood and sleep disturbance. The patient is not nervous/anxious.      Objective:     Vital Signs (Most Recent):  Temp: 98.5 °F (36.9 °C) (12/11/24 1125)  Pulse: 93 (12/11/24 1125)  Resp: 15 (12/11/24 1125)  BP: (!) 141/78 (12/11/24 1125)  SpO2: 97 % (12/11/24 1125) Vital Signs (24h Range):  Temp:  [97.8 °F (36.6 °C)-98.5 °F (36.9 °C)] 98.5 °F (36.9 °C)  Pulse:  [] 93  Resp:  [13-19] 15  SpO2:  [88 %-99 %] 97 %  BP: (118-143)/(65-89) 141/78     Weight: 70.5 kg (155 lb 6.8 oz)  Body mass index is 25.86 kg/m².  No intake or output data in the 24 hours ending 12/11/24 1259      Physical Exam  Vitals and nursing note reviewed.   Constitutional:       General: She is not in acute distress.     Appearance: Normal appearance. She is not ill-appearing, toxic-appearing or diaphoretic.   HENT:      Head: Normocephalic and atraumatic.      Right Ear: External ear normal.      Left Ear: External ear normal.      Nose: Nose normal.      Mouth/Throat:      Mouth: Mucous membranes are moist.   Eyes:      General: No scleral icterus.        Right eye: No discharge.         Left eye: No discharge.      Extraocular Movements: Extraocular movements intact.    Cardiovascular:      Rate and Rhythm: Normal rate.      Heart sounds: Normal heart sounds. No murmur heard.     No friction rub.   Pulmonary:      Effort: Pulmonary effort is normal. No respiratory distress.      Breath sounds: Normal breath sounds. No stridor. No wheezing, rhonchi or rales.   Chest:      Chest wall: No tenderness.   Abdominal:      General: Abdomen is flat. Bowel sounds are normal. There is no distension.      Tenderness: There is abdominal tenderness.   Musculoskeletal:         General: No swelling.      Cervical back: Normal range of motion.      Right lower leg: No edema.      Left lower leg: No edema.   Skin:     General: Skin is warm and dry.      Capillary Refill: Capillary refill takes less than 2 seconds.      Coloration: Skin is not jaundiced or pale.      Findings: No bruising.   Neurological:      General: No focal deficit present.      Mental Status: She is alert and oriented to person, place, and time.      Motor: No weakness.   Psychiatric:         Mood and Affect: Mood normal.         Behavior: Behavior normal.         Thought Content: Thought content normal.             Significant Labs: All pertinent labs within the past 24 hours have been reviewed.    Significant Imaging: I have reviewed all pertinent imaging results/findings within the past 24 hours.

## 2024-12-11 NOTE — ASSESSMENT & PLAN NOTE
39 yoF with history of alcohol use disorder and prior history of alcohol withdrawal presenting with concerns for alcohol withdrawal. High risk with seizures, will schedule taper.     - Vitals q4h  - CIWA monitoring  - Diazepam 10mg q8   - Diazepam 10mg if 2 criteria are met: Systolic BP>160, Diastolic BP >110 or Pulse >110  - continue Vitamin supplementation- Thiamine, Folic acid, Vit. B12, and Multivitamin qd

## 2024-12-11 NOTE — CONSULTS
[facility]   DEPARTMENT OF PSYCHIATRY     IDENTIFIERS & DEMOGRAPHICS:     SERVICE: Addiction  ENCOUNTER: initial    -- PATIENT IDENTIFIERS: Verónica Piña  [id number]  1985  39 y.o.  female  -- LOCATION OF PATIENT: unit (med/surg)    -- PRESENT WITH PATIENT DURING SESSION: ALONE  -- SOURCES OF INFORMATION: EHR/chart, PATIENT  -- LOCATION OF ENCOUNTER PROVIDER: [place], [place]    -- ENCOUNTER PROVIDER: Tae Wallace MD        PRESENTATION:   History of Present Illness   **  OVERVIEW OF THE HPI:    This is a 39 year old lady with past psychiatric history of anxiety/depression and alcohol use disorder with history of alcohol withdrawals with DT and seizures who presents with hematemesis and concerns for a seizure. She denies any history of varices or cirrhosis in the past. Admitted to hospital medicine for further management regarding alcohol withdrawals and management of GI bleed. Psychiatry was consulted for recommendations regarding alcohol use and withdrawals.      SUBJECTIVE/CURRENT FINDINGS:    Pt awake and alert, mildly anxious-appearing and with constricted affect on interview. Confirms history of depression/anxiety and alcohol use that has gradually worsened since the death of her  in November 2023. Denies psychiatric history prior to this, however has struggled and utilized alcohol as a coping skill. Notes since then with a history of daily 6-pack and typically with additional tiny bottle (100mL?) of liquor. After recent OSH admission in 08/2024 related to alcohol withdrawals, eventually attempted going sober. Had been sober for the past 5 weeks, during this time was attending AA meetings. States she had a relapse for no reason other than everything culminating and her thoughts causing her to turn towards alcohol. Notes she came to the ED this admission due to concerns of withdrawals, brought in by her spouse's parents after concerns for seizure-like activity. Recently with  "consuming several beers and some liquor, EtOH notably 308 on admit. Pt is remorseful towards her actions and wishing to completely quit alcohol. At present, endorsing some withdrawal symptoms such as anxiety, sweating, and mild bilateral hand tremors. Feels this has slightly improved following benzodiazepines. Denies any further incidences of seizures and denies AVH at this time.    Pt also endorsing significant depression/anxiety that has been persistent through this time. With prompting, able to confirm she started Celexa in 09/2024 with minimal benefit, more recently switched to Paxil in 11/11/24 at PCP. Also takes PRN Atarax for anxiety and panic attacks. Despite medication compliance, still reports significant anxiety and depressive thoughts. Currently endorsing depressed/anxious mood, decreased sleep (3-4 hours nightly, trouble falling and staying asleep), appetite changes with unintentional 30lb weight gain over last 6+ weeks, difficulty concentrating, anhedonia and fatigue. Also reports excessive worrying, generalized anxiety, and racing thoughts. Stresses about "everything" including bills, her pets, financial responsibilities, worrying what to do if her car breaks down. Also reports passive SI and hopelessness in the past she potentially attributes towards grieving and loss of her . Denies SI/HI and denies any intent/plan. Other than alcohol use, denies any tobacco use or recreational substance use. In regards to treatment plan, patient wants to continue with AA and potentially seek outpatient rehab near her home in [place], [place] or also near [place], [place]. Wants to quit alcohol entirely for the sake of improving her health and working on her depression/anxiety. Pt is also interested in establishing with outpatient psychiatry for further medication management and potential therapy.    Per Chart Review:    Per chart review, pt admitted 8/30/24 to OSH intensive care related to severe alcohol withdrawals. " Following this admission, had been following up at [facility], started on Celexa 09/2024 for depression/anxiety then transitioned to Paxil 11/11/24 due to minimal improvement of symptoms.    In the ED, with symptoms concerning for CIWA and alcohol withdrawals, had received Valium 5mg IM x3 and Ativan 5mg IM x1. Was started today on scheduled Valium taper per primary team.    REVIEW OF SYSTEMS:    >> SOURCES: patient, chart     Y   Sleep Disturbance/Disruption  rate/frequency: frequent/often (4/5)  course: chronic  steady/consistent  +insomnia    3-4 hours nightly, trouble falling and staying asleep   Y   Appetite/Weight Change  +weight gain    unintentional 30lb weight gain over last 6+ weeks   Y   Alterations in Energy Level  rate/frequency: frequent/often (4/5)  +fatigue/anergia     Y   Impaired Focus/Concentration  +easy distractibility, +inattentive     Y   Excessive Anxiety/Worry  +generalized anxiety/worry, +restlessness     N   Dysregulated Mood/Behavior   N   Manic Symptomatology   N   Psychosis   N   Trauma-Related   N   Impulsivity/Compulsivity/Obsessionality   N   Disordered Eating   N   Dissociation   N   Pain   N   Cardiopulmonary Symptoms   N   Abnormal Involuntary Movements    Regarding the current presentation, no other significant issues or complaints are voiced or known at this time.      ADD-ON PSYCHOTHERAPY:     N/A         HISTORY:   Patient Information   **  >> SOURCES: patient, chart review       PSYCH  SUBSTANCE  FAMILY  SOCIAL  MEDICAL     Y   Previous/Pre-Existing Psychiatric Diagnoses  confirmed/substantiated  Depression/Anxiety, Alcohol use disorder severe   Y   Past Psychotropic Trials  Wellbutrin, Celexa, Paxil   N   Current Psychiatric Provider  Sees PCP   N   Hx of Outpatient Psychiatric Treatment   Y   Hx of Psychiatric Hospitalization  08/2024 OSH for alcohol  withdrawals   N   Hx of Suicidal Ideation/Threats   N   Hx of Suicide Attempts/Gestures   N   Hx of Homicidal Ideation/Threats   N   Hx of Homicidal Behavior   N   Hx of Non-Suicidal Self-Injurious Behavior   N   Hx of Perpetrated Violence   N   Documented Hx of Malingering   N   Hx of Psychosis   N   Hx of Bipolar Diathesis   Y   Hx of Depression  mild-moderate   Y   Hx of Anxiety  mild-moderate   Y   Hx of Insomnia  mild-moderate   N   Hx of Delirium     N   Hx of Formal CASSIE Treatment   Y   Recent Alcohol Consumption  +SASQ  recently sober 5 weeks, previously daily 6 pack beer   +   Drinking Pattern (frequency)  +daily   +   Drinking Pattern (amount)  +heavy drinking   N   Hx of Nicotine Use   Y   Hx of Alcohol Misuse/Abuse  current   N   Hx of Illicit Drug Misuse/Abuse   N   Hx of Prescription Drug Misuse/Abuse   +   Drug Experimentation/Usage  denies     N   Family Psychiatric History   +   Family Hx of Suicide  no     N   Developmental Delay/Disability   Y   GED/High School Diploma   Y   Post-Secondary Education  some college   N   Currently Employed   N   Currently on Disability   Y   Financially Stable  of modest means  looking for employment   Y   Functions Independently   Y   Domiciled   Y   Intact Support System   Y   Heterosexual/Cisgender   N   Currently in a Relationship   Y   Ever    Y   Ever /  +   N   Children/Dependents   N   Samaritan/Spiritual   N   Hobbies/Recreational Activities   N   Hx of  Service     N   Ever Charged/Convicted   N   Current Probation/[place]/Diversion   N   Hx of Incarceration     N   Hx of Seizures   N   Hx of Head Trauma    >> EHR (EPIC): reviewed/reconciled      The patient's past medical history has been reviewed and updated as appropriate  "within the electronic health record system.  See PROBLEM LIST & HISTORY for details.    Scheduled and PRN Medications: The electronic chart was reviewed and updated as appropriate.  See MEDCARD for details.    Allergies:  Patient has no known allergies.      EXAMINATION:   Objective   **  VITALS:  BP (!) 143/79 (BP Location: Left arm, Patient Position: Lying)   Pulse 110   Temp 98.5 °F (36.9 °C) (Oral)   Resp 17   Ht 5' 5" (1.651 m)   Wt 70.5 kg (155 lb 6.8 oz)   SpO2 99%   Breastfeeding No   BMI 25.86 kg/m²     MENTAL STATUS EXAMINATION:  Appearance: distressed, ill-appearing, appears stated age, normal weight, in withdrawal  appropriately dressed, adequately groomed    Behavior & Attitude: participative, under good behavioral control, able to redirect, appropriate eye contact  calm, engaged, agreeable, cooperative    Movements & Motor Activity: +tremor  normal gait, normal station, no weakness    mild bilateral hand tremors present  Speech & Language: normal rate, normal volume, normal quantity, normal latency, spontaneous, reciprocal, fluent    Mood: neutral  Affect: constricted    Thought Process & Associations: linear, goal-directed, organized, logical, coherent, relevant, abstract  no loosening of associations    Thought Content & Perceptions: no delusions, no paranoid ideation, no ideas of reference, no grandiosity, no hyperreligiosity, no hallucinations, no responding to internal stimuli    Sensorium: awake, alert, clear  no confusion, no delirium    Orientation: grossly intact, oriented to person, oriented to place, oriented to time, oriented to situation    Recent & Remote Memory: intact (recent), intact (remote)    Attention & Concentration: intact  attentive to conversation, not easily distracted    Fund of Knowledge: intact, vocabulary proficient    Insight: intact, fair  demonstrates sufficient awareness of condition/situation    Judgment: intact, fair  heeds " instructions/advice        RISK & REGULATORY:   Impression   **   RISK PARAMETERS:     N   Suicidal Ideation/Threats   N   Suicide Attempts/Gestures   N   Homicidal Ideation/Threats   N   Homicidal Behavior   N   Non-Suicidal Self-Injurious Behavior   N   Perpetrated Violence     NOTE: RISK PARAMETERS are current to the encounter/episode  NOT inclusive of past history.       FIREARMS & WEAPONS:     Y   Ready Access to Firearms   Y   Gun Safety Counseled  e.g., proper storage, inherent risk   +   Further Considerations  hunting rifle, ammo stored separately, pt counseled on firearms and removal     MEDICAL DECISION MAKING:     - DIAGNOSTICS: a diagnostic psychiatric evaluation was performed and responsiveness to treatment was assessed  ability/capacity to respond to treatment: adequate/intact     REGULATORY:    -- : REVIEWED      INFORMED CONSENT & SHARED DECISION MAKING are the hallmark and bedrock of good clinical care, and as such have been employed and obtained, respectively, to the degree possible.  Discussed, to the extent possible, diagnosis, risks and benefits of proposed treatment (e.g., medication, therapy) vs alternative treatments vs no treatment, potential side effects of these treatments, and the inherent unpredictability of treatment.  Resources have been provided via the patient instructions in the AVS to supplement, augment, and reinforce discussions, counseling, and/or interventions.       - ABILITY TO UNDERSTAND, PARTICIPATE & ENGAGE: present and intact     - AGREEABLE TO TREATMENT (consent/assent): the patient consents to treatment     - RELIABILITY/ACCURACY: the patient is deemed to be a reliable and factually accurate historian      WARNINGS & PRECAUTIONS:  >> In cases of emergencies (e.g. SI/HI resulting in danger to self or others, functioning deteriorating to the level of grave disability), call 911 or [phone], or present to the emergency department for  immediate assistance.    >> Individuals should not operate a motor vehicle or heavy machinery if effects of medications or underlying symptoms/condition impair the ability to do so safely.    >> FULLY comply with ANY/ALL medication as prescribed/instructed and report ANY/ALL suspected adverse effects to appropriate health care providers.      ASSESSMENT & PLAN:   Assessment & Plan   **  DIAGNOSES & PROBLEMS:       1.  Alcohol use disorder, severe, dependence    2.  Alcohol withdrawal syndrome with unspecified complication    3.  MDD, recurrent, moderate    4.  Generalized anxiety disorder    PSYCHOTROPIC REGIMEN:   (C)=Continue as prescribed  (A)=Adjust as noted  (I)=Iniitate  (D)=Discontinue      1.  Valium taper 10mg TID today, followed by Valium 10mg BID tomorrow (C)    2.  PRN Ativan 2mg PO/IM q4h for concerns of CIWA (A)    3.  Paroxetine 20mg PO daily (C)    4.  Replete thiamine per primary    -- ASSESSMENT (synthesis  analysis):     39 year old lady with past psychiatric history of anxiety/depression and alcohol use disorder with history of alcohol withdrawals with DT and seizures who presents with hematemesis and concerns for a seizure. She denies any history of varices or cirrhosis in the past. Admitted to hospital medicine for further management regarding alcohol withdrawals and management of GI bleed. Psychiatry was consulted for recommendations regarding alcohol use and withdrawals.    -- PLAN (goals  recommentations):          - CURRENT CONDITION: exacerbated  as compared to typical baseline  - WITH REASONABLE MEDICAL CERTAINTY, based on history, chart review, available collateral information, and a present-state examination:   -- psychiatric hospitalization is not indicated    * PEC is NOT INDICATED - rescind if in place     >> prescription drug management (i.e., the review, recommendation, or consideration without recommendation of medications) was employed during the encounter  >> discussed the  following (to the extent possible) with the patient and/or other interested parties, if not otherwise known or stated prior, providing psychoeducation and/or resources (e.g., references, fact sheets) as applicable, utilizing an appropriate developmental and sociocultural approach:    -- diagnosis, target symptoms, intended outcomes, and possible benefits and risks of medication, as well as alternatives (including no treatment)     -- possible expectable adverse effects and/or drug interactions of any proposed individual psychotropic agents, as well as the inherent unpredictability of treatment     -- the importance of full compliance with any prescribed medication     -- the proper protocol and/or steps to take if questions or problems arise    -- relevant warnings and safety considerations, including, but not limited to, the prohibition against operating a motor vehicle or heavy machinery if effects of medications or underlying symptoms/condition impair the ability to do so competently and safely    -- relevant legal aspects of medication, including, but not limited to, the prohibition against carrying pills loose outside of the bottle and the sharing of medication with anyone, as well as the safe and secure storage of all pharmaceuticals  >> DEFER management of NON-PSYCHIATRIC medication(s) to primary and/or specialist provider(s)  >> discussed common adverse effects of ANTIDPRESSANTS, such as anxiety, agitation/restlessness, nausea/gastrointestinal upset, headache, insomnia or fatigue, possible weight gain, sexual side effects, and discontinuation syndrome, as well as more rare and serious ones such as suicidality (black box warning), activation of hypomania/lokesh, hyponatremia, prolonged QTc, bleeding risks, fragility fractures, and serotonin syndrome; additionally the possibility of intolerability, as well as idiosyncratic and allergic reactions was reviewed   >> advised to abstain from alcohol and illicit drug  use  >> counseled on full abstinence from alcohol and substances of abuse (illicit and prescription), as well as maintenance of a recovery lifestyle  >> harm reduction techniques discussed, as warranted, to mitigate risk from problematic behaviors  >> serial laboratory testing (e.g. PETH, serum ethanol, urine toxicology) recommended and/or planned to provide accountability, as well as guide and refine treatment moving forward  >> importance of lifelong, active engagement in 12 step (or equivalent) mutual self-help program(s) was stressed/reinforced, including regular meeting attendance and acquisition/maintenance of a sponsor  >> education and/or resources discussed and/or provided for various addiction recovery and rehabilitation options  >> motivational interviewing applied, relapse prevention provided  >> tolerating alcohol (or sedative-hypnotic) detox with no s/sx of complicated withdrawal at this time, though remains in the window and at elevated risk  >> case discussed with staff psychiatrist  >> will continue to follow with you, thank you    DELIRIUM PROTOCOL     > DELIRIUM is a HIGH RISK MEDICAL CONDITION with significantly elevated rates of morbidity and mortality, routinely rendering a person temporarily incapacitated and interfering with the effective management of underlying medical conditions. As such, proactive and comprehensive management is essential. Implementation of the following recommendations should be considered BEST PRACTICE:  >> if feasible, please utilize non-pharmacologic approaches FIRST for agitation; PRN medications can be considered if this approach is insufficient or ineffective  >> AVOID the use of PHYSICAL RESTRAINTS whenever possible; whenever not, always seek to MINIMIZE their use  >> keep window shades open and room lit during day and room dim at night in order to promote normal sleep-wake cycles  >> reduce unnecessary stimulation/noise; TV screen and sound should be off unless  patient is actively engaged with the program  >> minimize disruptions at night  >> correct sensory deficits, when present, with provision of eyeglasses and/or hearing aids  >> optimize nutrition and hydration status  >> continue to manage medical conditions and assess for and treat pain  >> consider PT/OT consult, as early mobility and exercise have been shown to decrease duration of delirium  >> attempt to maintain consistency of key staff who will routinely interact with the patient  >> encourage family at bedside  >> orient patient often to date, location, and situation, including reason for hospitalization and current plan of care  >> keep whiteboard (or similar) in patient's room current with the date and names of key treatment team members  >> utilize 1:1 sitter for monitoring, if warranted  >> LIMIT or DISCONTINUE the use of narcotics, benzodiazepines, anticholinergics, antihistaminergics, steroids, and other known deliriogenic medications  >> continue treatment of the underlying causative etiology of delirium, the correction of which is the ultimate objective in delirium management; if unknown, continue exhaustive medical workup to elucidate the source(s)    ALCOHOL (OR SEDATIVE-HYPNOTIC) WITHDRAWAL PROTOCOL    >> recommendations provided herein should be seen as rough guidelines, as an alcohol/benzodiazepine withdrawal protocol should be individualized and modified routinely, as clinically warranted  >> the absence of a positive serum or urine toxicology for alcohol does not exclude recent heavy prolonged drinking, as there may have been a delay in the patient presenting; a withdrawal protocol may still be warranted, based on the clinical situation  >> in the absence of specified contraindications, err on the side of giving more than less benzodiazepine; diazepam is favored for its long half-life, while lorazepam is typically indicated when hepatic metabolism is of utmost concern  >> provide supportive care  (fluid and electrolyte replacement; caloric support; vitamin supplementation/repletion)  >> parenteral thiamine is preferred (at least initially), and should be given prior to glucose, to prevent/treat Wernicke-Korsakoff Syndrome  >> vital signs should be frequently assessed and factored into management protocols, taking into account comorbidities and baseline cardiovascular status  >> prototypical threshold levels that indicate significant alcohol/benzodiazepine withdrawal and the need for additional doses of benzodiazepines include systolic BP>160, diastolic BP >100 or HR >110  >> frequent assessment with CIWA-Ar is the standard of care and the hallmark of excellent clinical practice  >> a score of 8+ on CIWA-Ar typically warrants administration of benzodiazepines  >> frequent need for administration of a benzodiazepine or CIWA-Ar scores >15 should trigger possible reclassification of the patient as at higher risk for complicated withdrawal, with concomitant adjustment of the detox protocol  >> if withdrawal does not progress over the first 24-48 hours, CIWA-Ar can be administered less frequently - an interval of four to six hours is reasonable   >> patients with a history of seizures, delirium tremens (or withdrawal delirium), or prolonged heavy alcohol (or sedative-hypnotic) consumption, who are minimally symptomatic or asymptomatic and are admitted to the hospital for other reasons, should typically be prophylactically treated with STANDING benzodiazepines  >> if a fixed-dose regimen is utilized, patients should still be reassessed frequently, and additional doses of medication given if a score of 8+ is achieved on the CIWA-Ar  >> if such elevated CIWA-Ar scores are reached routinely, prophylaxis is not adequate, and the regimen should be adjusted accordingly  >> if delirium ensues, rule out and treat potential alternative etiologies, even if delirium tremens is likely   >> identify and correct metabolic  derangements and volume deficits, and determine if ICU monitoring is warranted  >> in complicated withdrawal, benzodiazepines should be given via IV route whenever possible to assure proper absorption  >> titrate benzodiazepines to effect (ideally light sedation) which may entail the use of massive doses of medication  >> if the patient is incapacitated and the CIWA-Ar cannot be used, instead utilize the Vitale Agitation-Sedation Scale (RASS) with a goal of 0 to -2  >> in refractory delirium tremens (or withdrawal delirium), phenobarbital or propofol can be used; these agents are preferred to dexmedetomidine as they act directly on the SILAS and NMDA receptors, which underlie the derangements seen in alcohol (or sedative-hypnotic) withdrawal  >> intubation may be required in refractory DTs, especially with the use of propofol  >> post-withdrawal treatment is paramount; medically supervised withdrawal manages the patient through the withdrawal symptoms but does not treat alcohol (or sedative-hypnotic) use disorders; patients completing withdrawal are at high risk of relapse to resumed alcohol (or sedative-hypnotic) consumption/use  >> if in a facility, patients should be given explicit plans for follow-up care PRIOR to discharge  >> follow-up may involve ongoing treatment through primary care or a specialized addiction treatment program or physician, and is dependent on a number of factors, including availability and patient willingness  >> continuing care, including pharmacotherapy (e.g., MAT) and psychosocial intervention for alcohol (or sedative-hypnotic) use disorders, are indicated    See below for pertinent laboratory and radiographic findings.    Tae Wallace MD  [facility] PGY-2   [facility]      CHART REVIEW: available documentation has been reviewed, and pertinent elements of the chart have been incorporated into this evaluation where appropriate.       KEY & LINKS:         Y  = yes/endorses     N  = no/denies     U  = unknown/unable to assess    ADHD   AIMS   AUDIT   AUDIT-C   C-SSRS (Screen)   C-SSRS (Short)   C-SSRS (Full)   DAST   DAST-10   MILDRED-7   MoCA   PCL-5   PHQ-9   CASSIE   YMRS      PSYCHIATRIC SCREENINGS:       DIAGNOSTIC TESTING:   Results   **   Glu 75  12/11/2024  Li *   *  TSH 0.886  8/31/2024    HgA1c 5.4  11/13/2024  VPA *   *   FT4 *   *    Na 139  12/11/2024  CLZ *   *  WBC 5.66  12/11/2024    Cr 0.7  12/11/2024  ANC 4.1; 72.0;   12/11/2024   Hgb 11.1 (L)  12/11/2024     BUN 7  12/11/2024  Trop I *   *  HCT 32.7 (L)  12/11/2024     GFR >60.0  12/11/2024     12/10/2024   (L)  12/11/2024     Alb 3.4 (L)  12/11/2024   PRL *   *  B12 *   *     T Bili 0.5  12/11/2024  Chol *   *  B9 *   *    ALP 86  12/11/2024  TGs *   *  B1 *   *    AST 58 (H)  12/11/2024  HDL *   *  Vit D *   *     ALT 41  12/11/2024  LDL *   *  HIV Non-reactive  12/10/2024     INR 1.0  12/10/2024  Selina *   *   Hep C Non-reactive  12/10/2024    GGT *   *  Lip 34  8/30/2024  RPR Non-reactive  1/26/2022    MCV 99 (H)  12/11/2024   NH4 *   *  UPT Negative  12/10/2024      PETH *   *  THC Negative  12/10/2024    ETOH 208 (H)  12/10/2024  JOANIE Negative  12/10/2024    EtG *   *  AMP Negative  12/10/2024     (A)  12/10/2024  OPI Negative  12/10/2024    BZO Negative  12/10/2024  MTD Negative  12/10/2024     BAR Negative  12/10/2024  BUP *   *    PCP Negative  12/10/2024  FEN *   *     Results for orders placed or performed during the hospital encounter of 12/10/24   EKG 12-lead    Collection Time: 12/10/24  2:58 PM   Result Value Ref Range    QRS Duration 80 ms    [facility] QTC Calculation 436 ms    Narrative    Test Reason : R56.9,    Vent. Rate :  97 BPM     Atrial Rate :  97 BPM     P-R Int : 120 ms          QRS Dur :  80 ms      QT Int : 344 ms       P-R-T Axes :  71  70  66 degrees    QTcB Int : 436 ms    Normal sinus  rhythm  Nonspecific ST and T wave abnormality  Abnormal ECG  When compared with ECG of 30-Aug-2024 15:03,  Nonspecific T wave abnormality now evident in Anterior leads  Confirmed by Axel Tamayo ([id number]) on 12/10/2024 3:14:11 PM    Referred By: AAAREFERRAL SELF           Confirmed By: Axel Tamayo     Past Medical History:   Diagnosis Date    Anxiety     Depression     IUD (intrauterine device) in place         Inpatient consult to Psychiatry  Consult performed by: Tae Wallace MD  Consult ordered by: David Harrison MD        [facility] STEPDOWN FLEX [id number]

## 2024-12-11 NOTE — HOSPITAL COURSE
Patient admitted to hospital medicine for further management. Patient noted to have elevated CIWA over night of 12/10, prn lorazepam given. Uptitrated scheduled valium taper. She tolerated taper well. No signs of GI bleeding during hospitalization and abdominal pain improved with PPI. She was weaned off Valium with plans for outpatient addiction treatment and possible Addiction follow up for medication for alcohol use.     Pt deemed appropriate for discharge; seen and examined prior to departure. Plan discussed with pt, who was agreeable and amenable; medications were discussed and reviewed, outpatient follow-up scheduled, ER precautions were given, all questions were answered to the pt's satisfaction, and she was subsequently discharged.

## 2024-12-11 NOTE — DISCHARGE INSTRUCTIONS
REFERRAL RECOMMENDATIONS FOR SUBSTANCE ABUSE & MENTAL HEALTH      IN CASE OF SUICIDAL THINKING, call the National Suicide Hotline Number: 988    988 Suicide & Crisis Lifeline: 980 , 9-444-294-RTYI (8536)  https://988[employer]fuel.CARDFREE       SUBSTANCE ABUSE:     [facility] (formerly known as the ABU)  [x] [phone], Option 2  [x] [address] [facility] 4[facility], [place] [address]  [x] [email or web address]/services/[email or web address]-program  [x] [facility] delivers comprehensive and collaborative treatment for alcohol and substance use disorders.  Excellent program for working professionals or anyone else seeking recovery.  [x] Requires insurance approval prior to starting program, call number above for more information.  [x] Intensive Outpatient Rehabilitation Program - M-F 9am-3pm - daily groups with psychologists and social workers, sessions with MDs 3x per week   [x] Ambulatory detox and dual diagnosis available      SUBOXONE:     NOTE: some Suboxone clinics require their clients to participate in a structured program (such as an IOP) in order to be prescribed Suboxone.  Some clinics have a long waiting list.  Most of these clinics do not accept walk-in clients, so call first to to learn what must be done to get started on Suboxone.    [facility] - [phone] (can do Sublocade)  [address] [facility], [place] [address]    [address]  [phone]    [facility] - [phone] (can do Sublocade)  [address] [place] [address]    [facility]  [address] [place]  [phone]     [facility] (very short waiting list, may accept some walk-in's but call first if possible)  [address], [place] [address]  [phone]; [phone]    [facility]   [address] [place]    [phone]    [facility] (can usually be seen within a week but is cash only  for appointment)  [address] [facility] ([facility])  [address] [place], [place]  [phone]    [facility] ([facility])  [address] [facility] [address]  [phone]    [place], [place]:    [facility] - [address] [place] [address] - Tel: [phone]    Omkar Jonnie - [address] [place] [address] - Tel: [phone]    Narciso Kunz - [address] [place] [place] [address] - Tel: [phone]    Enoc Montague - [address] [place] [address] - Tel: [phone]    Driss Olmstead - [address] [place] [address] - Tel: [phone]    [place], [place]:     Dr. Randell Robin and Dr. Mayco oTrres - [address] [place], [place] - Tel: [phone]    Dr. Lucretia Venegas - [address] [facility], [place] - Tel: [phone]    Dr. Omar Jacobs - Tel: [phone]    Dr. Darius Dumont - [facility] - [phone]      METHADONE:     [facility] (the only methadone clinic in the [facility], has two locations)  [x] [place] - [address] [place] [address], [phone]  [x] [facility] [place] [address], [phone]      12 STEP PROGRAMS (and similar):     Alcoholics Anonymous (local)  [x] [phone]  [x] www.[place].org for schedules for in-person and online meetings  [x] There are AA meetings throughout the day all over [facility]  [x] AA costs nothing to attend; they pass a basket for donations but this is not required    Narcotics Anonymous  [x] [phone]  [x] www.noana.org  [x] There are NA meetings throughout the day all over town  [x] NA costs nothing to attend; they pass a basket for donations but this is not required    Alcoholics Anonymous Online Intergroup (national)  [x] www.aa-intergroup.org  [x] Good resource for large, nation-wide meetings  [x] Can also attend smaller, local meetings in other cities  [x] Countless meetings all day and all night  [x] AA costs nothing to attend; they pass a basket for donations  but this is not required    Flying Sober - 24/7 zoom meetings for women and coed - sign on anytime, anywhere!  [email or web address] [email or web address]-[email or web address]    Online Intergroup of AA - 121 Open AA [place] Meeting - 24/7 zoom meetings  https://aa-intergroup.org/meetings/    LOOKING FOR AN ALTERNATIVE TO 12 STEP PROGRAMS - check out:  SMART Recovery: https://www.smartrecovery.org/about-us  [facility] Recovery: https://recoverydharma.org      DETOX UNITS (USUALLY 5-7 DAYS):     [facility]: [address], [place]  [phone], call first to ensure bed availability    [facility]: [address] [facility]  [phone], Option 1, call first to ensure bed availability    [facility]ox [facility] Recovery Center: [facility] [address], [facility]  [phone] (intake by appointment only)    [facility]: [address], [facility]  [phone]      INTENSIVE OUTPATIENT PROGRAMS:     [facility] (formerly known as the ABU)  [x] [phone], Option 2  [x] [address] [facility] 4th Floor, [facility] [address]  [x] https://www.[facility].org/services/[facility]-recovery-program  [x] [facility] delivers comprehensive and collaborative treatment for alcohol and substance use disorders.  Excellent program for working professionals or anyone else seeking recovery.  [x] Requires insurance approval prior to starting program, call number above for more information.  [x] Intensive Outpatient Rehabilitation Program - M-F 9am-3pm - daily groups with psychologists and social workers, sessions with MDs 3x per week   [x] Ambulatory detox and dual diagnosis available    [facility] Intensive Outpatient Program  [x] [phone]  [x] [address] [facility] (the clinic not on [facility] main [facility])  [x] Call number above for more info and to check insurance requirements    [facility]  [address] [place] [address] [phone]    [facility]:  [address] [facility]  [address]?, [place], [place] [address]?, [phone]  [address] [facility]?, [place], [place] [address]?, [phone]    RESIDENTIAL REHABS (USUALLY 28 DAYS):     [facility]: [address], [phone]    [facility]: [address] , [facility]  [phone] (intake by appointment only)    [facility] (men only) [address], [place], [phone]    [facility] (Female only) [address] [place], [phone]    [facility]: [address], [place], men's program [phone], women's program [phone]    [facility]: [address], [place], [phone]    [facility]: [address], [place], [place], [phone]    [facility]: Men only, [phone], [address] [facility] Al[address] [facility]: [address], [place], [place], [phone]    [facility]: Critical access hospital3 [place], [place],  [phone]  New Location: [address] [place] [address] 100, [place] [address], [phone]    [facility]:   ?[address] [facility]. [address]?[place], [place] [address]?[phone]    [place]: [address] [place] [address], [phone]    [place]: [place], [place], [phone]     [facility]: [place], [phone]    [facility]: [place], [phone]    [facility]: [place], [place], [phone]    [place]: [place], [phone]    [facility]: [address] [facility], [place] [address], [phone]    COMMUNITY ADDICTION CLINICS:     [facility]: [address] [facility], [address] [place] -[phone] -or- [address] [place] [address]    [facility] [place]: [address] [facility] [place]  [address]     [facility]: Clinics [phone]; Crisis [phone]    [facility]: [address] [place] [address]    [facility]: [address] [place] [address]    [facility]: [address] [place] [address],    [facility]: 2nd Floor [address] [place] [address]    [facility]: [address], [place] [address]    [facility], [address] [place], [place] [address]    [facility]: [address] [facility], [place] [phone]  (serves youth 16-23 years old)    Critical access hospital Center: [facility] [phone]    [facility]: [address] [facility], [place] [phone]    [facility]: [address] [facility] [address] [phone]    [facility]: [address] [place], [place] [address]10 [facility], [address], [phone]     [facility] [place] [facility]: [address] [facility], [phone], [phone]    RESOURCES IN OTHER [facility]:     [facility]: [address] [facility], [phone], [phone]    [facility]: [address] [facility], [address], [phone]    [facility], [address] [place] [address], [phone]    [facility] Health: [address] [facility] [facility], [phone]    [facility] Behavioral Health, 900 [facility], [phone] (State mental health facility)    [facility], [address] [facility], [phone] ([facility])    [facility], [address], [place], [phone] ([facility], and [facility])    [facility], [address] , [place], [phone] ([facility])    [facility] Hotline [phone], [phone]    [facility], [address] [facility], [facility] Assessment [place], [address] [facility], [address], [facility], [address] [facility]  "Or[address] [facility], 500 [facility]. [address]., [place] [facility], [address], [place]    [facility] Human Services, 401 [facility], [address]35[facility] [phone]    [facility], [address] [facility] [phone]    [facility] Addiction Recovery, [address] [facility] [phone]    [facility] Recovery, [address] [facility], [phone]      Mongolian SPEAKING (en español):     Información de la reunión de Alcohólicos Anónimos  [facility], 10:00 am  Habla español  Esta reunión está abierta y cualquiera puede asistir.    Sinhala speaking Alcoholics anonymous meetings:  El "J Luis Wilbur AA Skype" es un j luis on line de Alcohólicos Anónimos en [facility]. El j luis es mark, gratuito y virtual a través de Skype Audio. El j luis funciona mediante rick llamada grupal de voz, por lo que no se utiliza la videollamada, ni se pueden rios las imágenes o rostros de los participantes. Hace izzy años y medio abrimos el primer J Luis de AA por Skype en [place], lauren actualmente asisten personas desde [place], [place], [place], [place], [place], [place], [place], [place], [place], [place], [place] y [place], entre otros.    El j luis es muy útil para los alcohólicos que residen en lugares donde no se celebran reuniones de AA, o residen en lugares donde las reuniones de AA son un número limitado de días a la semana, o para aquellos compañeros que se hayan de viaje o que, por cualquier motivo, se hayan convalecientes y no pueden desplazarse. Todos los días nos reuniones a las 21:00 (hora española)    Podéis obtener más información sobre el j luis y yuridia sesiones en la página web [email or web address]      MENTAL HEALTH:     [facility] - Outpatient Clinic  [phone]    [facility] - General " Psychiatry Intensive Outpatient Program  [facility] (formerly known as the BMU)  [phone], option 3    [facility]  Department of Psychiatry - Dual Diagnosis Intensive Outpatient Program  [facility] (formerly known as the ABU)  [phone], option 2      COMMUNITY MENTAL HEALTH CENTERS:     [facility]  ([facility])  Serves [facility].  Serves uninsured patients & those with Medicaid.  Main location: [address] [place] [id number] [phone]  Walk-in's available during regular business hours.  24/7 Crisis Line: [phone]    [facility]  (aka [facility], [facility])  Serves [facility].  Serves uninsured patients, those with Medicaid and some private plans.  Walk-in's available during regular business hours.  Primary care services available as well.  [facility]: [address] [facility] [place] [address];  [phone]  [place]: [address] [place] [address];  [phone]  24/7 Crisis Line: [phone]    [facility]  Serves uninsured patients & those with Medicaid, call for more info.  Primary care, pediatrics, HIV treatment, and dentistry services available as well.  Three locations.  [phone]    [facility]?Corporate Office  Serves patients with Medicaid, Medicare, and private insurance  [address]  [place],?[place] [address] [phone]    [facility]  Serves uninsured on a sliding scale, as well as Medicaid, Medicare, and private plans.  Eight locations around the [facility] area.  [phone]    [facility]  Serves uninsured patients & those with Medicaid, private insurances.  Primary care available as well.  [phone]  112 [place]  [address]    [facility] Outpatient Psychiatry  Serves veterans who were honorably discharged.  [address] [place] [address]  [phone]  24/7 Veterans Crisis Line: [phone] (Press 1)    If you have private insurance and need to find a specialist, please contact your insurance network to request a list of providers covered by your benefits.      MENTAL HEALTH/ADDICTIVE DISORDERS:     AA ([phone]), NA ([phone])   National Suicide Prevention Lifeline- Call 1-905.543.6002 Available 24 hours everyday  [facility] [phone]; Crisis Line [phone] - Call for options A-F:  Intensive Outpatient Treatment/ Day programs   [facility], please contact   [facility], please contact [phone] or [phone] to speak with an admissions counselor.  [facility] (Methadone Maintenance)   [address] [place] [address], [phone]  [address] [place] [address] [phone]  [facility], [address] [place] [address], [phone]  [place] [facility] [phone]  [place] [facility] please contact [phone]  [facility], [address] [facility], 4th [place] [address] Phone: [phone]   Acer  Fady Office: [place] [address], [phone]  [place] Office: [address] [facility], [address], [place] [address], [phone]  [place] Office: [address] [facility] [place] [address] [phone]    Outpatient Substance Abuse Treatment   [facility] (Methadone Maintenance)   [address] [place] [address], [phone]  [address] [place] [place] [address] [phone]  [facility], [address] [place] [address], [phone]  Acer  [place] Office: [address], [place] [address], [phone]  [place] Office: [address] [facility] [address], [place] [address],  [phone]  [place] Office: [address] [place] [address] [phone]  [place] [facility], [address] [place] [address] [phone]   [facility], [address] [facility], [address], [phone]  [facility]over, [address] [place], [place] [phone]    Residential Substance Abuse Treatment   [facility] [address] [facility], [phone]  [facility], 4[address] [facility], [phone]  [facility] (men only) [address] [place] [address], [phone]  Women at the [facility] (women only) [address] [place] [address] [phone]  [facility], [address] [place] [address] [phone]  [facility] (women only), intakes at 4[address] [facility], [phone]  [facility] (7-day program, $100, [address], [phone], [phone], [phone])  [place] [facility] (Men only, [phone]), [address], [place] (Vets*/Non-Vets)  [facility] (Men only, $400/month program fee) [address] [facility] [place], [phone]  [facility] (Women over age 39 only), [address] [facility], [phone]    Out of Area:    [place], [address] [facility], [place], [place] ([phone])  [facility] Recovery Program (men only), [address] [place] [address], [phone]  [facility], [address] [place], [place] ([phone])  [place], [address] [place] [address] Dr. Noyola, [place] ([phone])  [facility], [address] [facility], [phone]  [facility] (Women only, has to have mental illness, can be homeless or substance abuser), [phone]        DOMESTIC VIOLENCE RESOURCES:     Advocacy  [facility] ([facility])  [address] [address]    [facility] ? [phone]  Services provided: emergency shelter, individual advocacy,  information and referrals, group support, children's program, medical advocacy, forensic medical exams, primary care, legal assistance, counseling, safety planning, and caregiver support    [facility] HEALING AND EMPOWERMENT [place]  Confidential location  [facility] ? [phone]  Services provided: short term emergency shelter, all services provided are free of charge    [facility] CENTERS FOR COMMUNITY ADVOCACY  Multiple locations in [facility], [facility], [facility], and [facility] ([place], [place], and [place])    [place] ? [phone]  Services provided: emergency shelter, individual advocacy, information and referrals, group support, children's program, medical advocacy, legal assistance in obtaining restraining orders, counseling, safety planning, and caregiver support    [facility]   Emergency Shelter   [phone]  Emergency Services ,Legal and Financial Assistance Services ,Housing Services ,Support Services     [facility]   [phone]  Emergency Services ,Counseling Services , Housing Services ,Support Services ,Children's Services     WOMEN WITH A VISION  [address] [place] [address]  WWAV ? [phone]  Services provided: advocacy, health education and supportive services, specializing in free healing services for marginalized groups, including LGBTQ individuals and sex workers    SEXUAL TRAUMA AWARENESS AND RESPONSE (STAR)  [address] [place] [address]    STAR ? [phone]  Services provided: individual advocacy, information and referrals, group support, medical advocacy, legal assistance, counseling, and safety planning for survivors of sexual assault    [facility] ([facility])  [address] [place] [address]  [facility] Forensic Program ? [phone]  Services provided: free forensic medical exams for sexual assault and domestic violence, which can be performed up to 5 days  after an incident. It is not necessary to make a police report to receive a forensic medical exam    Legal  [facility]  [address], [place] [address]  Project SAVE ? [phone]  Services provided: free emergency legal representation for survivors of doemstic violence residing in [facility]. Legal services may include temporary restraining orders, temporary child support, custody, and use of property    [facility] LEGAL SERVICES (SLLS)  [address] [facility], [place] [address]  SLLS ? [phone]  Services provided: free legal representation for survivors of domestic violence residing in [facility]. Legal services may include temporary child support, custody, and divorce      HOTLINES:     [facility] DOMESTIC VIOLENCE HOTLINE  [phone]    Services provided: free and confidential hotline for victims and survivors of domestic violence. All calls will be routed to a domestic violence service provided in the victim or survivor's area    NATIONAL HUMAN TRAFFICKING HOTLINE  ([phone]    Services provided: national anti-trafficking hotline serving victims and survivors of human trafficking. Provides information about local resources, and access to safe space to report tips, seek services, and ask for help    VIA LINK  211 or [phone]    Service provided: counselors can provide crisis counseling. Counselors can also provide information and referrals to programs which can help with needs such as food, shelter, medical care, financial assistance, mental health services, substance abuse treatment, senior services, childcare, and more      HOMELESS SHELTERS:      Homeless shelters  [facility]  Emergency shelter for individuals and families  [address]  [phone]  [facility]  Emergency shelter for men only  Meals daily 6am, 2pm, & 6pm  Clothing, case management M-F by appointment (ID/job/housing/legal assistance), mail  [address] [place]    [phone]  [place]  Emergency shelter for men  [address] [facility]  [phone]  Emergency shelter for women  [address]   [phone]  Breakfast & lunch daily, dinner M-F  Case management, job counseling services   [facility]  Emergency shelter for teens and young adults up to 20yo  [address]   [phone]  [place] [facility]  Emergency shelter for women over 19yo and their kids  [address] [place] [address]  [phone]  [facility]  Day program, meals M-F 1PM (arrive early)  Showers, laundry, hygiene kits, showers, phones, , notary services, case management, ID assistance  [address]   [phone] M-F 8am-2:30pm  [employer] [facility]  Day program  MTW 7:30am-3:30pm,  8:30am-3:30pm  Crisis intervention, employment assistance, food/clothing, hygiene kits, bus tokens, mail  [address] [facility] [address]  [phone]  [facility]  Mobile outreach for homeless persons in [facility]  [phone]  [facility]  Primary healthcare, case management, dental services, TB placement  Call ahead  [address] [facility] 2[facility]  [phone]  [facility]  Connects homeless people with their loved ones in other cities by providing transportation costs   [phone]      [place] RESOURCES:     [place] [facility] Mental Health Crisis Response Team:    Region 12 ([place], [place], [place], and [facility]) ([facility] and [facility])  [phone]      Outpatient Mental Health & Addiction Clinic Resources for both [facility] and [employer] [facility]:    [facility] Resources  Website: www.mhr.org  Main Number: 336-918-3245    [facility] ([facility])  [address] ([address] [place] [address]  [phone]    [facility] ([facility])  [address] ([address] [facility]) [facility]  [place] [address]  [phone]    [facility]  [address] ([address]) [place] [address]  [phone]    [facility]  [address] ([address] [place] [address]) [place] [address]  [phone]      Addiction Treatment Resources for both [facility] and [facility]:    [facility] (Detox, Residential, PHP, IOP, and Aftercare Programs)  [address] [place] [address]  [phone]    [facility] (Residential, IOP, Transitional Living, and Aftercare Programs)  [address] [place] [address]  [phone]    [facility] (Inpatient, Residential, Detox, IOP, Outpatient, and Aftercare Programs)  [address] [place] [address] [place] [address]  [phone] or toll free at [phone]      Outpatient Mental Health Psychotherapy Resources for both [facility] and [facility]:    Michelle Lim, [facility]  [address]  [place] [address]  [phone]  Specialties: Depression, Anxiety, and Life Transitions    Apoorva Nguyen, PhD  [address] [place] [address] 90  [address]  [place] [address]  [phone]  Specialties: Testing and Evaluation, Education and Learning Disabilities, and ADHD    Stephy Langford, [facility] [address] [place] [address], [place] [address]  [phone]  Specialties: Obsessive-Compulsive (OCD), Depression, and Relationship Issues    Opal Martinez LPC [address] [place] [address]  [phone]  Specialties: Trauma & PTSD, Mood Disorders, and Anxiety    Opal Lopez, PhD, [facility]  [facility] [address] [facility] [address]  [phone]  Specialties: Family Conflict, Child, and Relationship Issues    Gricelda Soares, [facility] [place] [address] [phone]  Specialties: Anxiety, Depression, and Anger Management        IN CASE OF SUICIDAL THINKING,  call the National Suicide Hotline Number: 988    988 Suicide & Crisis Lifeline: 988 , 6-283-027-TALK 8255)  Provides 24/7, free and confidential support for people in distress, prevention and crisis resources for you or your loved ones, and best practices for professionals.    Call, text or chat.  https://988Duplia.org

## 2024-12-11 NOTE — PROGRESS NOTES
[facility]   DEPARTMENT OF PSYCHIATRY     IDENTIFIERS & DEMOGRAPHICS:     SERVICE: Addiction  ENCOUNTER: initial    -- PATIENT IDENTIFIERS: Verónica Piña  [id number]  1985  39 y.o.  female  -- ENCOUNTER PROVIDER: William Sistrunk, MD        PRESENTATION:     OVERVIEW OF THE HPI:    This is a 39 year old lady with anxiety/depression, alcohol use disorder, and history of alcohol withdrawals with DT and seizures who presents with hematemesis and concerns for a seizure.    REVIEW OF SYSTEMS:     N   Sleep Disturbance/Disruption   N   Appetite/Weight Change   N   Alterations in Energy Level   N   Impaired Focus/Concentration   N   Depressive Symptomatology   Y   Excessive Anxiety/Worry   N   Dysregulated Mood/Behavior   N   Manic Symptomatology   N   Psychosis   N   Trauma-Related   N   Impulsivity/Compulsivity/Obsessionality   N   Disordered Eating   N   Dissociation   N   Pain   N   Cardiopulmonary Symptoms   N   Abnormal Involuntary Movements    Regarding the current presentation, no other significant issues or complaints are voiced or known at this time.       ADD-ON PSYCHOTHERAPY:     ADD-ON THERAPY     HISTORY:     >> SOURCES: patient       PSYCH  SUBSTANCE  FAMILY  SOCIAL  MEDICAL     Y   Previous/Pre-Existing Psychiatric Diagnoses  AUD   N   Current Psychiatric Provider   N   Hx of Outpatient Psychiatric Treatment   N   Hx of Psychiatric Hospitalization   N   Hx of Suicidal Ideation/Threats   N   Hx of Suicide Attempts/Gestures   N   Hx of Homicidal Ideation/Threats   N   Hx of Homicidal Behavior   N   Hx of Non-Suicidal Self-Injurious Behavior   N   Hx of Perpetrated Violence   N   Documented Hx of Malingering   N   Hx of Psychosis   N   Hx of Bipolar Diathesis   N   Hx of Depression   Y   Hx of Anxiety   N   Hx of Insomnia   N   Hx of Delirium     N   Hx  "of Formal CASSIE Treatment   Y   Recent Alcohol Consumption  +SASQ  heavy 6 pack + pint liquer   +   Drinking Pattern (amount)  +heavy drinking   N   Hx of Nicotine Use   Y   Hx of Alcohol Misuse/Abuse   N   Hx of Illicit Drug Misuse/Abuse   N   Hx of Prescription Drug Misuse/Abuse    >> SCHEDULED AND PRN MEDS: reviewed/reconciled  see MEDCARD      Allergies:  Patient has no known allergies.     EXAMINATION:     VITALS:  /64 (BP Location: Right arm)   Pulse 105   Temp 98.2 °F (36.8 °C) (Oral)   Resp 18   Ht 5' 5" (1.651 m)   Wt 70.5 kg (155 lb 6.7 oz)   SpO2 97%   Breastfeeding No   BMI 25.86 kg/m² ***    MSE  {PSYCH SCREENINGS:64728::"PSYCHIATRIC SCREENINGS:"}      RISK & REGULATORY:      FIREARMS & WEAPONS:     N   Ready Access to Firearms     REGULATORY:      INFORMED CONSENT & SHARED DECISION MAKING are the hallmark and bedrock of good clinical care, and as such have been employed and obtained, respectively, to the degree possible.  Discussed, to the extent possible, diagnosis, risks and benefits of proposed treatment (e.g., medication, therapy) vs alternative treatments vs no treatment, potential side effects of these treatments, and the inherent unpredictability of treatment.        WARNINGS & PRECAUTIONS:  >> In cases of emergencies (e.g. SI/HI resulting in danger to self or others, functioning deteriorating to the level of grave disability), call 911 or 988, or present to the emergency department for immediate assistance.    >> Individuals should not operate a motor vehicle or heavy machinery if effects of medications or underlying symptoms/condition impair the ability to do so safely.    >> FULLY comply with ANY/ALL medication as prescribed/instructed and report ANY/ALL suspected adverse effects to appropriate health care providers.       ASSESSMENT & PLAN:     A/P     DIAGNOSTIC TESTING:      Glu 96  12/10/2024  Li *   *  TSH 0.886  8/31/2024    HgA1c 5.4  " 11/13/2024  VPA *   *   FT4 *   *    Na 142  12/10/2024  CLZ *   *  WBC 7.40  12/10/2024    Cr 0.7  12/10/2024  ANC 5.5; 74.5;  (H)  12/10/2024   Hgb 11.5 (L)  12/10/2024     BUN 7  12/10/2024  Trop I *   *  HCT 34.5 (L)  12/10/2024     GFR >60.0  12/10/2024     12/10/2024   (L)  12/10/2024     Alb 3.8  12/10/2024   PRL *   *  B12 *   *     T Bili 0.3  12/10/2024  Chol *   *  B9 *   *    ALP 98  12/10/2024  TGs *   *  B1 *   *    AST 81 (H)  12/10/2024  HDL *   *  Vit D *   *     ALT 49 (H)  12/10/2024  LDL *   *  HIV Non-reactive  12/10/2024     INR 1.0  12/10/2024  Selina *   *   Hep C Non-reactive  12/10/2024    GGT *   *  Lip 34  8/30/2024  RPR Non-reactive  1/26/2022    MCV 98  12/10/2024   NH4 *   *  UPT Negative  12/10/2024      PETH *   *  THC Negative  12/10/2024    ETOH 208 (H)  12/10/2024  JOANIE Negative  12/10/2024    EtG *   *  AMP Negative  12/10/2024     (A)  12/10/2024  OPI Negative  12/10/2024    BZO Negative  12/10/2024  MTD Negative  12/10/2024     BAR Negative  12/10/2024  BUP *   *    PCP Negative  12/10/2024  FEN *   *     Results for orders placed or performed during the hospital encounter of 12/10/24   EKG 12-lead    Collection Time: 12/10/24  2:58 PM   Result Value Ref Range    QRS Duration 80 ms    [facility] QTC Calculation 436 ms    Narrative    Test Reason : R56.9,    Vent. Rate :  97 BPM     Atrial Rate :  97 BPM     P-R Int : 120 ms          QRS Dur :  80 ms      QT Int : 344 ms       P-R-T Axes :  71  70  66 degrees    QTcB Int : 436 ms    Normal sinus rhythm  Nonspecific ST and T wave abnormality  Abnormal ECG  When compared with ECG of 30-Aug-2024 15:03,  Nonspecific T wave abnormality now evident in Anterior leads  Confirmed by Axel Tamayo ([id number]) on 12/10/2024 3:14:11 PM    Referred By: AAAREFERRAL SELF           Confirmed By: Axel Tamayo        BEASLEY & LINKS:        Y  = yes/endorses     N  = no/denies     U  =  unknown/unable to assess    ADHD   AIMS   AUDIT   AUDIT-C   C-SSRS (Screen)   C-SSRS (Short)   C-SSRS (Full)   DAST   DAST-10   MILDRED-7   MoCA   PCL-5   PHQ-9   CASSIE   YMRS     Consults  [facility] EMERGENCY DEPARTMENT

## 2024-12-11 NOTE — PROGRESS NOTES
[facility] - Stepdown Flex ([place] [address])  [facility] Medicine  Progress Note    Patient Name: Verónica Piña  MRN: [id number]  Patient Class: IP- Inpatient   Admission Date: 12/10/2024  Length of Stay: 1 days  Attending Physician: David Harrison MD  Primary Care Provider: Juan Barahona II, MD        Subjective     Principal Problem:Alcohol withdrawal syndrome with complication        HPI:  This is a 39 year old lady with anxiety/depression, alcohol use disorder, and history of alcohol withdrawals with DT and seizures who presents with hematemesis and concerns for a seizure. Patient states that she had dark red emesis earlier this morning of one episode. She also states that she had some shakes and concerns for a seizure. She states her last drink was 2 beers about a week ago and denies any other drugs or alcohol since then. Patient states that she is trying to quit and has had intermittent shaking episodes since stopping. She does complain of chest pain that goes from her throat to her abdomen and shortness of breath when she is vomiting. She denies any abdominal pain, fevers, chills. She states that she had one episode of emesis here but it was clear. She denies any history of varices or cirrhosis in the past.     Upon chart review, noted to have multiple admissions for alcohol withdrawals. She was not amenable to treatment then but is now open for outpatient treatment.    In the ED, patient was VSS. Hgb noted to be 11 from baseline of 14 from a couple of days ago. She also was noted to have an elevated alcohol level to 300s. Patient was admitted to hospital medicine for further management.    Overview/Hospital Course:  Patient admitted to hospital medicine for further management. Patient noted to have elevated CIWA over night of 12/10, prn lorazepam given. Uptitrated scheduled valium taper.     Interval History: prn ativan given this AM for elevated CIWA. Otherwise NAEON and VSS. No more hematemesis  noted    Review of Systems   Constitutional:  Negative for chills and fever.   HENT:  Negative for ear pain and sinus pain.    Eyes:  Negative for pain.   Respiratory:  Negative for cough and shortness of breath.    Cardiovascular:  Negative for chest pain and leg swelling.   Gastrointestinal:  Positive for abdominal pain and nausea. Negative for constipation, diarrhea, rectal pain and vomiting.   Genitourinary:  Negative for dysuria and flank pain.   Musculoskeletal:  Negative for arthralgias, back pain, joint swelling, myalgias and neck pain.   Neurological:  Negative for weakness, numbness and headaches.   Psychiatric/Behavioral:  Negative for agitation, dysphoric mood and sleep disturbance. The patient is not nervous/anxious.      Objective:     Vital Signs (Most Recent):  Temp: 98.5 °F (36.9 °C) (12/11/24 1125)  Pulse: 93 (12/11/24 1125)  Resp: 15 (12/11/24 1125)  BP: (!) 141/78 (12/11/24 1125)  SpO2: 97 % (12/11/24 1125) Vital Signs (24h Range):  Temp:  [97.8 °F (36.6 °C)-98.5 °F (36.9 °C)] 98.5 °F (36.9 °C)  Pulse:  [] 93  Resp:  [13-19] 15  SpO2:  [88 %-99 %] 97 %  BP: (118-143)/(65-89) 141/78     Weight: 70.5 kg (155 lb 6.8 oz)  Body mass index is 25.86 kg/m².  No intake or output data in the 24 hours ending 12/11/24 1259      Physical Exam  Vitals and nursing note reviewed.   Constitutional:       General: She is not in acute distress.     Appearance: Normal appearance. She is not ill-appearing, toxic-appearing or diaphoretic.   HENT:      Head: Normocephalic and atraumatic.      Right Ear: External ear normal.      Left Ear: External ear normal.      Nose: Nose normal.      Mouth/Throat:      Mouth: Mucous membranes are moist.   Eyes:      General: No scleral icterus.        Right eye: No discharge.         Left eye: No discharge.      Extraocular Movements: Extraocular movements intact.   Cardiovascular:      Rate and Rhythm: Normal rate.      Heart sounds: Normal heart sounds. No murmur heard.      No friction rub.   Pulmonary:      Effort: Pulmonary effort is normal. No respiratory distress.      Breath sounds: Normal breath sounds. No stridor. No wheezing, rhonchi or rales.   Chest:      Chest wall: No tenderness.   Abdominal:      General: Abdomen is flat. Bowel sounds are normal. There is no distension.      Tenderness: There is abdominal tenderness.   Musculoskeletal:         General: No swelling.      Cervical back: Normal range of motion.      Right lower leg: No edema.      Left lower leg: No edema.   Skin:     General: Skin is warm and dry.      Capillary Refill: Capillary refill takes less than 2 seconds.      Coloration: Skin is not jaundiced or pale.      Findings: No bruising.   Neurological:      General: No focal deficit present.      Mental Status: She is alert and oriented to person, place, and time.      Motor: No weakness.   Psychiatric:         Mood and Affect: Mood normal.         Behavior: Behavior normal.         Thought Content: Thought content normal.             Significant Labs: All pertinent labs within the past 24 hours have been reviewed.    Significant Imaging: I have reviewed all pertinent imaging results/findings within the past 24 hours.    Assessment and Plan     * Alcohol withdrawal syndrome with complication  39 yoF with history of alcohol use disorder and prior history of alcohol withdrawal presenting with concerns for alcohol withdrawal. High risk with seizures, will schedule taper.     - Vitals q4h  - CIWA monitoring  - Diazepam 10mg q8   - Diazepam 10mg if 2 criteria are met: Systolic BP>160, Diastolic BP >110 or Pulse >110  - continue Vitamin supplementation- Thiamine, Folic acid, Vit. B12, and Multivitamin qd        Acute blood loss anemia  Anemia is likely due to acute blood loss which was from Gastritis . Most recent hemoglobin and hematocrit are listed below.  Recent Labs     12/10/24  1240   HGB 11.5*   HCT 34.5*     Plan  - Monitor serial CBC: Every 12 hours  -  Transfuse PRBC if patient becomes hemodynamically unstable, symptomatic or H/H drops below 7/21.  - Patient has not received any PRBC transfusions to date  - Patient's anemia is currently stable    Pericardial cyst  Noted on CT chest 11/13. Asymptomatic     - will f/u outpatient for monitoring and management      Upper GI bleed  Patient's hemorrhage is due to gastrointestinal bleed, patient does not have a propensity for bleeding.. Patients most recent Hgb, Hct, platelets, and INR are listed below.  Recent Labs     12/10/24  1240   HGB 11.5*   HCT 34.5*   *   INR 1.0       Plan  - Will trend hemoglobin/hematocrit Every 12 hours  - Will monitor and correct any coagulation defects  - Will transfuse if Hgb is <7g/dl (<8g/dl in cases of active ACS) or if patient has rapid bleeding leading to hemodynamic instability  - Stable now, no evidence of ongoing hematemesis currently. Will hold off on GI consult but low threshold to consult if patient continues to have s/s of bleeding.     Generalized anxiety disorder  Continue home hydroxyzine and paxil        VTE Risk Mitigation (From admission, onward)           Ordered     IP VTE LOW RISK PATIENT  Once         12/10/24 1533     Place sequential compression device  Until discontinued         12/10/24 1533                    Discharge Planning   FOZIA: 12/14/2024     Code Status: Full Code   Medical Readiness for Discharge Date:                            David Harrison MD  Department of Hospital Medicine   [facility] - Stepdown Flex ([place]-14)

## 2024-12-11 NOTE — PLAN OF CARE
Problem: Adult Inpatient Plan of Care  Goal: Plan of Care Review  12/11/2024 0551 by Celine Lugo LPN  Outcome: Progressing  12/11/2024 0551 by Celine Lugo LPN  Outcome: Progressing  Goal: Patient-Specific Goal (Individualized)  12/11/2024 0551 by Celine Lugo LPN  Outcome: Progressing  12/11/2024 0551 by Celine Lugo LPN  Outcome: Progressing  Goal: Absence of Hospital-Acquired Illness or Injury  12/11/2024 0551 by Celine Lugo LPN  Outcome: Progressing  12/11/2024 0551 by Celine Lugo LPN  Outcome: Progressing  Goal: Optimal Comfort and Wellbeing  12/11/2024 0551 by Celine Lugo LPN  Outcome: Progressing  12/11/2024 0551 by Celine Lugo LPN  Outcome: Progressing  Goal: Readiness for Transition of Care  12/11/2024 0551 by Celine Lugo LPN  Outcome: Progressing  12/11/2024 0551 by Celine Lugo LPN  Outcome: Progressing     Problem: Gastrointestinal Bleeding  Goal: Optimal Coping with Acute Illness  12/11/2024 0551 by Celine Lugo LPN  Outcome: Progressing  12/11/2024 0551 by Celine Lugo LPN  Outcome: Progressing  Goal: Hemostasis  12/11/2024 0551 by Celine Lugo LPN  Outcome: Progressing  12/11/2024 0551 by Celine Lugo LPN  Outcome: Progressing

## 2024-12-11 NOTE — NURSING
Patient arrived on unit at approximately 0430. VSS ex HR elevated. Pt HR sustaining in 130s-140's. Tremors visible. C/o of n/v. MD notified. LR going at 999 ml/hr started. Valium was due at later time, requested an additional dose to be given. MD [place] gave the okay to give additional dose. Hydroxyine and Zofran given for anxiety and n/v. Patient tolerated pills well and obtained relief. Spo2 wnl on RA. Skin intact. Instructed to call staff for assistance. No known needs voiced at this time. Bed is locked and in lowest position. Call light is within reach.

## 2024-12-11 NOTE — PLAN OF CARE
[facility]ayan[facility] - Stepdown Flex ([place])  Initial Discharge Assessment      Discharge Plan A and Plan B have been determined by review of patient's clinical status, future medical and therapeutic needs, and coverage/benefits for post-acute care in coordination with multidisciplinary team members.      Primary Care Provider: Juan Barahona II, MD    Admission Diagnosis: Melena [K92.1]  Seizure [R56.9]  Chest pain [R07.9]  Alcohol use disorder [F10.90]  Hematemesis, unspecified whether nausea present [K92.0]    Admission Date: 12/10/2024  Expected Discharge Date: 12/14/2024    Transition of Care Barriers: Substance Abuse    Payor: MEDICAID / Plan: [facility] COMMUNITY PLAN [facility] ([place] MEDICAID) / Product Type: Managed Medicaid /     Extended Emergency Contact Information  Primary Emergency Contact: Bruce Junior  Address: [address] [place], [place] [facility] [facility]  Home Phone: [phone]  Mobile Phone: [phone]  Relation: Grandparent    Discharge Plan A: Home with family  Discharge Plan B: Home      [id number] [place] - [address] AT [facility]  [address]  [place] [address]  Phone: [phone] Fax: [phone]    [facility] [place]  [place] - [address]  [address]  [place] [address]  Phone: [phone] Fax: [phone]    [facility]  [address]  [place] [address]  Phone: [phone] Fax: [phone]      Initial Assessment (most recent)       Adult Discharge Assessment - 12/11/24 1245          Discharge Assessment    Assessment Type Discharge Planning Assessment     Confirmed/corrected address, phone number and insurance Yes     Source of Information patient     When was your last doctors appointment? 09/10/24   Juan Barahona II, MD    Prior to hospitilization cognitive status: Alert/Oriented;No Deficits     Current cognitive status: Alert/Oriented;No Deficits     Walking or Climbing Stairs Difficulty  no     Dressing/Bathing Difficulty no     Home Accessibility wheelchair accessible     Equipment Currently Used at Home none     Readmission within 30 days? No     Patient currently being followed by outpatient case management? No     Do you currently have service(s) that help you manage your care at home? No     Do you take prescription medications? Yes     Do you have prescription coverage? Yes     Coverage MEDICAID - [facility] COMMUNITY PLAN [facility] ([place] MEDICAID) -     Do you have any problems affording any of your prescribed medications? No     Is the patient taking medications as prescribed? yes     Who is going to help you get home at discharge? Bruce Junior (Grandparent)  [phone] (Mobile)     How do you get to doctors appointments? car, drives self;family or friend will provide     Are you on dialysis? No     Do you take coumadin? No     Discharge Plan A Home with family     Discharge Plan B Home     DME Needed Upon Discharge  none     Discharge Plan discussed with: Patient     Transition of Care Barriers Substance Abuse        Physical Activity    On average, how many days per week do you engage in moderate to strenuous exercise (like a brisk walk)? 7 days     On average, how many minutes do you engage in exercise at this level? 40 min        Financial Resource Strain    How hard is it for you to pay for the very basics like food, housing, medical care, and heating? Not very hard        Housing Stability    In the last 12 months, was there a time when you were not able to pay the mortgage or rent on time? No     At any time in the past 12 months, were you homeless or living in a shelter (including now)? No        Transportation Needs    Has the lack of transportation kept you from medical appointments, meetings, work or from getting things needed for daily living? No        Food Insecurity    Within the past 12 months, you worried that your food would run out before you got the money to buy more. Never  true     Within the past 12 months, the food you bought just didn't last and you didn't have money to get more. Never true        Stress    Do you feel stress - tense, restless, nervous, or anxious, or unable to sleep at night because your mind is troubled all the time - these days? Only a little        Social Isolation    How often do you feel lonely or isolated from those around you?  Never        Alcohol Use    Q1: How often do you have a drink containing alcohol? 4 or more times a week     Q2: How many drinks containing alcohol do you have on a typical day when you are drinking? 7 to 9     Q3: How often do you have six or more drinks on one occasion? Daily or almost daily        Utilities    In the past 12 months has the electric, gas, oil, or water company threatened to shut off services in your home? No        Health Literacy    How often do you need to have someone help you when you read instructions, pamphlets, or other written material from your doctor or pharmacy? Never                          CM met with patient at bedside to complete discharge planning assessment.  Patient alert and oriented xs 4.  Patient verified all demographic information on facesheet is correct.  Patient verified PCP is Juan Moss II, MD  Patient verified primary health insurance is [place] Medicaid.-- MEDICAID - [facility] COMMUNITY [facility] ([place] MEDICAID Patient  is agreeable with bedside medication delivery.    Patient with NO POA or Living Will.  Patient not on dialysis or medication coumadin.  Patient with no 30 day admission.  Patient with no financial issues at this time.  Patients grand father  will provide transportation upon discharge from facility.  Patient will have assistance from his wife upon discharge Patient independent with ADLs. All questions answered regarding Case Management Discharge Planning, patient verbalized understanding.  Discharge booklet with CM's contact information given to patient.   Case  Management will continue to follow and assist with discharge planning.       30 day Readmit   yes     no  [x]    Referrals:  My Acute Care at Home  [] yes  [x ] no      OPCM  [ ] yes [x ] no           Sandy Sherwood RN  Case Management  [facility]  [phone]

## 2024-12-12 PROBLEM — F10.90 ALCOHOL USE DISORDER: Status: ACTIVE | Noted: 2024-12-12

## 2024-12-12 LAB
ALBUMIN SERPL BCP-MCNC: 3.2 G/DL (ref 3.5–5.2)
ALP SERPL-CCNC: 84 U/L (ref 40–150)
ALT SERPL W/O P-5'-P-CCNC: 37 U/L (ref 10–44)
ANION GAP SERPL CALC-SCNC: 9 MMOL/L (ref 8–16)
AST SERPL-CCNC: 47 U/L (ref 10–40)
BASOPHILS # BLD AUTO: 0.07 K/UL (ref 0–0.2)
BASOPHILS NFR BLD: 1.3 % (ref 0–1.9)
BILIRUB SERPL-MCNC: 0.5 MG/DL (ref 0.1–1)
BUN SERPL-MCNC: 5 MG/DL (ref 6–20)
CALCIUM SERPL-MCNC: 8.8 MG/DL (ref 8.7–10.5)
CHLORIDE SERPL-SCNC: 103 MMOL/L (ref 95–110)
CO2 SERPL-SCNC: 26 MMOL/L (ref 23–29)
CREAT SERPL-MCNC: 0.8 MG/DL (ref 0.5–1.4)
DIFFERENTIAL METHOD BLD: ABNORMAL
EOSINOPHIL # BLD AUTO: 0.2 K/UL (ref 0–0.5)
EOSINOPHIL NFR BLD: 4.3 % (ref 0–8)
ERYTHROCYTE [DISTWIDTH] IN BLOOD BY AUTOMATED COUNT: 14.2 % (ref 11.5–14.5)
EST. GFR  (NO RACE VARIABLE): >60 ML/MIN/1.73 M^2
GLUCOSE SERPL-MCNC: 88 MG/DL (ref 70–110)
HCT VFR BLD AUTO: 35.2 % (ref 37–48.5)
HGB BLD-MCNC: 12.2 G/DL (ref 12–16)
IMM GRANULOCYTES # BLD AUTO: 0.02 K/UL (ref 0–0.04)
IMM GRANULOCYTES NFR BLD AUTO: 0.4 % (ref 0–0.5)
LYMPHOCYTES # BLD AUTO: 1 K/UL (ref 1–4.8)
LYMPHOCYTES NFR BLD: 18 % (ref 18–48)
MAGNESIUM SERPL-MCNC: 1.8 MG/DL (ref 1.6–2.6)
MCH RBC QN AUTO: 34.1 PG (ref 27–31)
MCHC RBC AUTO-ENTMCNC: 34.7 G/DL (ref 32–36)
MCV RBC AUTO: 98 FL (ref 82–98)
MONOCYTES # BLD AUTO: 0.6 K/UL (ref 0.3–1)
MONOCYTES NFR BLD: 11.5 % (ref 4–15)
NEUTROPHILS # BLD AUTO: 3.6 K/UL (ref 1.8–7.7)
NEUTROPHILS NFR BLD: 64.5 % (ref 38–73)
NRBC BLD-RTO: 0 /100 WBC
PHOSPHATE SERPL-MCNC: 4.3 MG/DL (ref 2.7–4.5)
PLATELET # BLD AUTO: 130 K/UL (ref 150–450)
PMV BLD AUTO: 9.8 FL (ref 9.2–12.9)
POTASSIUM SERPL-SCNC: 3.7 MMOL/L (ref 3.5–5.1)
PROT SERPL-MCNC: 5.9 G/DL (ref 6–8.4)
RBC # BLD AUTO: 3.58 M/UL (ref 4–5.4)
SODIUM SERPL-SCNC: 138 MMOL/L (ref 136–145)
WBC # BLD AUTO: 5.55 K/UL (ref 3.9–12.7)

## 2024-12-12 PROCEDURE — 80053 COMPREHEN METABOLIC PANEL: CPT

## 2024-12-12 PROCEDURE — 36415 COLL VENOUS BLD VENIPUNCTURE: CPT

## 2024-12-12 PROCEDURE — 63600175 PHARM REV CODE 636 W HCPCS

## 2024-12-12 PROCEDURE — 25000003 PHARM REV CODE 250

## 2024-12-12 PROCEDURE — 25000003 PHARM REV CODE 250: Performed by: STUDENT IN AN ORGANIZED HEALTH CARE EDUCATION/TRAINING PROGRAM

## 2024-12-12 PROCEDURE — 85025 COMPLETE CBC W/AUTO DIFF WBC: CPT

## 2024-12-12 PROCEDURE — 83735 ASSAY OF MAGNESIUM: CPT

## 2024-12-12 PROCEDURE — 99232 SBSQ HOSP IP/OBS MODERATE 35: CPT | Mod: AF,HB,, | Performed by: PSYCHIATRY & NEUROLOGY

## 2024-12-12 PROCEDURE — 84100 ASSAY OF PHOSPHORUS: CPT

## 2024-12-12 PROCEDURE — 20600001 HC STEP DOWN PRIVATE ROOM

## 2024-12-12 RX ORDER — POLYETHYLENE GLYCOL 3350 17 G/17G
17 POWDER, FOR SOLUTION ORAL DAILY
Status: DISCONTINUED | OUTPATIENT
Start: 2024-12-12 | End: 2024-12-15 | Stop reason: HOSPADM

## 2024-12-12 RX ADMIN — Medication 100 MG: at 09:12

## 2024-12-12 RX ADMIN — PANTOPRAZOLE SODIUM 40 MG: 40 TABLET, DELAYED RELEASE ORAL at 09:12

## 2024-12-12 RX ADMIN — THERA TABS 1 TABLET: TAB at 09:12

## 2024-12-12 RX ADMIN — DIAZEPAM 10 MG: 5 TABLET ORAL at 02:12

## 2024-12-12 RX ADMIN — PAROXETINE HYDROCHLORIDE 20 MG: 10 TABLET, FILM COATED ORAL at 06:12

## 2024-12-12 RX ADMIN — FOLIC ACID 1 MG: 1 TABLET ORAL at 09:12

## 2024-12-12 RX ADMIN — DIAZEPAM 10 MG: 10 INJECTION, SOLUTION INTRAMUSCULAR; INTRAVENOUS at 08:12

## 2024-12-12 RX ADMIN — POLYETHYLENE GLYCOL 3350 17 G: 17 POWDER, FOR SOLUTION ORAL at 02:12

## 2024-12-12 RX ADMIN — DIAZEPAM 10 MG: 5 TABLET ORAL at 11:12

## 2024-12-12 RX ADMIN — DIAZEPAM 10 MG: 5 TABLET ORAL at 06:12

## 2024-12-12 NOTE — PROGRESS NOTES
[facility]   DEPARTMENT OF PSYCHIATRY     IDENTIFIERS & DEMOGRAPHICS:     SERVICE: Addiction  ENCOUNTER: subsequent    -- PATIENT IDENTIFIERS: Verónica Piña  [id number]  1985  39 y.o.  female  -- LOCATION OF PATIENT: unit (med/surg)    -- PRESENT WITH PATIENT DURING SESSION: ALONE  -- SOURCES OF INFORMATION: EHR/chart, PATIENT  -- LOCATION OF ENCOUNTER PROVIDER: [place], [place]    -- ENCOUNTER PROVIDER: Tae Wallace MD        PRESENTATION:   History of Present Illness   **  OVERVIEW OF THE HPI:    This is a 39 year old lady with past psychiatric history of anxiety/depression and alcohol use disorder with history of alcohol withdrawals with DT and seizures who presents with hematemesis and concerns for a seizure. She denies any history of varices or cirrhosis in the past. Admitted to hospital medicine for further management regarding alcohol withdrawals and management of GI bleed. Psychiatry was consulted for recommendations regarding alcohol use and withdrawals.      SUBJECTIVE/CURRENT FINDINGS:    Pt awake and amenable to interview. Notes sleeping well overnight, able to sleep several hours. Tolerating Valium taper well, still with some withdrawal symptoms such as mild anxiety and bilateral hand tremors. Notably with increased HR, reports when ambulating to restroom will notice her HR jumps to 120 prior to resting. Encouraged appropriate PO intake as able. Additionally attributing anxiety towards future plans after this hospitalization. Agreeable with continuing Valium TID today and can de-escalate tomorrow. Also discussed pt's interest in Naltrexone and can consider initiating medication in outpatient setting.    Per Chart Review:    Per chart review, pt compliant with Valium taper and did not require further PRNs overnight.    REVIEW OF SYSTEMS:    >> SOURCES: patient, chart     Y   Sleep Disturbance/Disruption  rate/frequency: frequent/often (4/5)  course: chronic   "steady/consistent  +insomnia    3-4 hours nightly, trouble falling and staying asleep   Y   Appetite/Weight Change  +weight gain    unintentional 30lb weight gain over last 6+ weeks   Y   Alterations in Energy Level  rate/frequency: frequent/often (4/5)  +fatigue/anergia     Y   Impaired Focus/Concentration  +easy distractibility, +inattentive     Y   Excessive Anxiety/Worry  +generalized anxiety/worry, +restlessness     N   Dysregulated Mood/Behavior   N   Manic Symptomatology   N   Psychosis   N   Trauma-Related   N   Impulsivity/Compulsivity/Obsessionality   N   Disordered Eating   N   Dissociation   N   Pain   N   Cardiopulmonary Symptoms   N   Abnormal Involuntary Movements    Regarding the current presentation, no other significant issues or complaints are voiced or known at this time.      ADD-ON PSYCHOTHERAPY:     N/A         HISTORY:   Patient Information   **  HISTORY    The patient's past medical history has been reviewed and updated as appropriate within the electronic health record system.  See PROBLEM LIST & HISTORY for details.    Scheduled and PRN Medications: The electronic chart was reviewed and updated as appropriate.  See MEDCARD for details.    Allergies:  Patient has no known allergies.      EXAMINATION:   Objective   **  VITALS:  /71 (BP Location: Left arm, Patient Position: Sitting)   Pulse 93   Temp 98.6 °F (37 °C) (Oral)   Resp 18   Ht 5' 5" (1.651 m)   Wt 70.5 kg (155 lb 6.8 oz)   LMP  (LMP Unknown)   SpO2 99%   Breastfeeding No   BMI 25.86 kg/m²     MENTAL STATUS EXAMINATION:  Appearance: distressed, ill-appearing, appears stated age, normal weight, in withdrawal  appropriately dressed, adequately groomed    improving compared to prior  Behavior & Attitude: participative, under good behavioral control, able to redirect, appropriate eye contact  calm, engaged, agreeable, cooperative    Movements & " Motor Activity: +tremor  normal gait, normal station, no weakness    mild bilateral hand tremors present, improving  Speech & Language: normal rate, normal volume, normal quantity, normal latency, spontaneous, reciprocal, fluent    Mood: neutral  Affect: constricted    Thought Process & Associations: linear, goal-directed, organized, logical, coherent, relevant, abstract  no loosening of associations    Thought Content & Perceptions: no delusions, no paranoid ideation, no ideas of reference, no grandiosity, no hyperreligiosity, no hallucinations, no responding to internal stimuli    Sensorium: awake, alert, clear  no confusion, no delirium    Orientation: grossly intact, oriented to person, oriented to place, oriented to time, oriented to situation    Recent & Remote Memory: intact (recent), intact (remote)    Attention & Concentration: intact  attentive to conversation, not easily distracted    Fund of Knowledge: intact, vocabulary proficient    Insight: intact, fair  demonstrates sufficient awareness of condition/situation    Judgment: intact, fair  heeds instructions/advice        RISK & REGULATORY:   Impression   **   RISK PARAMETERS:     N   Suicidal Ideation/Threats   N   Suicide Attempts/Gestures   N   Homicidal Ideation/Threats   N   Homicidal Behavior   N   Non-Suicidal Self-Injurious Behavior   N   Perpetrated Violence     NOTE: RISK PARAMETERS are current to the encounter/episode  NOT inclusive of past history.       FIREARMS & WEAPONS:     N   Ready Access to Firearms     MEDICAL DECISION MAKING:     - DIAGNOSTICS: a diagnostic psychiatric evaluation was performed and responsiveness to treatment was assessed  ability/capacity to respond to treatment: adequate/intact     REGULATORY:    -- : REVIEWED      INFORMED CONSENT & SHARED DECISION MAKING are the hallmark and bedrock of good clinical care, and as such have been employed and obtained, respectively,  to the degree possible.  Discussed, to the extent possible, diagnosis, risks and benefits of proposed treatment (e.g., medication, therapy) vs alternative treatments vs no treatment, potential side effects of these treatments, and the inherent unpredictability of treatment.  Resources have been provided via the patient instructions in the AVS to supplement, augment, and reinforce discussions, counseling, and/or interventions.       - ABILITY TO UNDERSTAND, PARTICIPATE & ENGAGE: present and intact     - AGREEABLE TO TREATMENT (consent/assent): the patient consents to treatment     - RELIABILITY/ACCURACY: the patient is deemed to be a reliable and factually accurate historian      WARNINGS & PRECAUTIONS:  >> In cases of emergencies (e.g. SI/HI resulting in danger to self or others, functioning deteriorating to the level of grave disability), call 911 or [phone], or present to the emergency department for immediate assistance.    >> Individuals should not operate a motor vehicle or heavy machinery if effects of medications or underlying symptoms/condition impair the ability to do so safely.    >> FULLY comply with ANY/ALL medication as prescribed/instructed and report ANY/ALL suspected adverse effects to appropriate health care providers.      ASSESSMENT & PLAN:   Assessment & Plan   **  DIAGNOSES & PROBLEMS:       1.  Alcohol use disorder, severe, dependence    2.  Alcohol withdrawal syndrome with unspecified complication    3.  MDD, recurrent, moderate    4.  Generalized anxiety disorder    PSYCHOTROPIC REGIMEN:   (C)=Continue as prescribed  (A)=Adjust as noted  (I)=Iniitate  (D)=Discontinue      1.  Agree with primary continue Valium taper 10mg TID today, followed by Valium 10mg BID tomorrow (C)    2.  PRN Ativan 2mg PO/IM q4h for concerns of CIWA (A)    3.  Paroxetine 20mg PO daily (C)    4.  Replete thiamine per primary    -- ASSESSMENT (synthesis  analysis):     39 year old lady with past psychiatric history of  anxiety/depression and alcohol use disorder with history of alcohol withdrawals with DT and seizures who presents with hematemesis and concerns for a seizure. She denies any history of varices or cirrhosis in the past. Admitted to hospital medicine for further management regarding alcohol withdrawals and management of GI bleed. Psychiatry was consulted for recommendations regarding alcohol use and withdrawals.    -- PLAN (goals  recommentations):          - CURRENT CONDITION: exacerbated  as compared to typical baseline  - WITH REASONABLE MEDICAL CERTAINTY, based on history, chart review, available collateral information, and a present-state examination:   -- psychiatric hospitalization is not indicated    * PEC is NOT INDICATED - rescind if in place     >> prescription drug management (i.e., the review, recommendation, or consideration without recommendation of medications) was employed during the encounter  >> discussed the following (to the extent possible) with the patient and/or other interested parties, if not otherwise known or stated prior, providing psychoeducation and/or resources (e.g., references, fact sheets) as applicable, utilizing an appropriate developmental and sociocultural approach:    -- diagnosis, target symptoms, intended outcomes, and possible benefits and risks of medication, as well as alternatives (including no treatment)     -- possible expectable adverse effects and/or drug interactions of any proposed individual psychotropic agents, as well as the inherent unpredictability of treatment     -- the importance of full compliance with any prescribed medication     -- the proper protocol and/or steps to take if questions or problems arise    -- relevant warnings and safety considerations, including, but not limited to, the prohibition against operating a motor vehicle or heavy machinery if effects of medications or underlying symptoms/condition impair the ability to do so competently and  safely    -- relevant legal aspects of medication, including, but not limited to, the prohibition against carrying pills loose outside of the bottle and the sharing of medication with anyone, as well as the safe and secure storage of all pharmaceuticals  >> DEFER management of NON-PSYCHIATRIC medication(s) to primary and/or specialist provider(s)  >> discussed common adverse effects of ANTIDPRESSANTS, such as anxiety, agitation/restlessness, nausea/gastrointestinal upset, headache, insomnia or fatigue, possible weight gain, sexual side effects, and discontinuation syndrome, as well as more rare and serious ones such as suicidality (black box warning), activation of hypomania/lokesh, hyponatremia, prolonged QTc, bleeding risks, fragility fractures, and serotonin syndrome; additionally the possibility of intolerability, as well as idiosyncratic and allergic reactions was reviewed   >> advised to abstain from alcohol and illicit drug use  >> counseled on full abstinence from alcohol and substances of abuse (illicit and prescription), as well as maintenance of a recovery lifestyle  >> harm reduction techniques discussed, as warranted, to mitigate risk from problematic behaviors  >> serial laboratory testing (e.g. PETH, serum ethanol, urine toxicology) recommended and/or planned to provide accountability, as well as guide and refine treatment moving forward  >> importance of lifelong, active engagement in 12 step (or equivalent) mutual self-help program(s) was stressed/reinforced, including regular meeting attendance and acquisition/maintenance of a sponsor  >> education and/or resources discussed and/or provided for various addiction recovery and rehabilitation options  >> motivational interviewing applied, relapse prevention provided  >> tolerating alcohol (or sedative-hypnotic) detox with no s/sx of complicated withdrawal at this time, though remains in the window and at elevated risk  >> case discussed with staff  psychiatrist  >> will sign off at this time, thank you    DELIRIUM PROTOCOL     > DELIRIUM is a HIGH RISK MEDICAL CONDITION with significantly elevated rates of morbidity and mortality, routinely rendering a person temporarily incapacitated and interfering with the effective management of underlying medical conditions. As such, proactive and comprehensive management is essential. Implementation of the following recommendations should be considered BEST PRACTICE:  >> if feasible, please utilize non-pharmacologic approaches FIRST for agitation; PRN medications can be considered if this approach is insufficient or ineffective  >> AVOID the use of PHYSICAL RESTRAINTS whenever possible; whenever not, always seek to MINIMIZE their use  >> keep window shades open and room lit during day and room dim at night in order to promote normal sleep-wake cycles  >> reduce unnecessary stimulation/noise; TV screen and sound should be off unless patient is actively engaged with the program  >> minimize disruptions at night  >> correct sensory deficits, when present, with provision of eyeglasses and/or hearing aids  >> optimize nutrition and hydration status  >> continue to manage medical conditions and assess for and treat pain  >> consider PT/OT consult, as early mobility and exercise have been shown to decrease duration of delirium  >> attempt to maintain consistency of key staff who will routinely interact with the patient  >> encourage family at bedside  >> orient patient often to date, location, and situation, including reason for hospitalization and current plan of care  >> keep whiteboard (or similar) in patient's room current with the date and names of key treatment team members  >> utilize 1:1 sitter for monitoring, if warranted  >> LIMIT or DISCONTINUE the use of narcotics, benzodiazepines, anticholinergics, antihistaminergics, steroids, and other known deliriogenic medications  >> continue treatment of the underlying  causative etiology of delirium, the correction of which is the ultimate objective in delirium management; if unknown, continue exhaustive medical workup to elucidate the source(s)    ALCOHOL (OR SEDATIVE-HYPNOTIC) WITHDRAWAL PROTOCOL    >> recommendations provided herein should be seen as rough guidelines, as an alcohol/benzodiazepine withdrawal protocol should be individualized and modified routinely, as clinically warranted  >> the absence of a positive serum or urine toxicology for alcohol does not exclude recent heavy prolonged drinking, as there may have been a delay in the patient presenting; a withdrawal protocol may still be warranted, based on the clinical situation  >> in the absence of specified contraindications, err on the side of giving more than less benzodiazepine; diazepam is favored for its long half-life, while lorazepam is typically indicated when hepatic metabolism is of utmost concern  >> provide supportive care (fluid and electrolyte replacement; caloric support; vitamin supplementation/repletion)  >> parenteral thiamine is preferred (at least initially), and should be given prior to glucose, to prevent/treat Wernicke-Korsakoff Syndrome  >> vital signs should be frequently assessed and factored into management protocols, taking into account comorbidities and baseline cardiovascular status  >> prototypical threshold levels that indicate significant alcohol/benzodiazepine withdrawal and the need for additional doses of benzodiazepines include systolic BP>160, diastolic BP >100 or HR >110  >> frequent assessment with CIWA-Ar is the standard of care and the hallmark of excellent clinical practice  >> a score of 8+ on CIWA-Ar typically warrants administration of benzodiazepines  >> frequent need for administration of a benzodiazepine or CIWA-Ar scores >15 should trigger possible reclassification of the patient as at higher risk for complicated withdrawal, with concomitant adjustment of the detox  protocol  >> if withdrawal does not progress over the first 24-48 hours, CIWA-Ar can be administered less frequently - an interval of four to six hours is reasonable   >> patients with a history of seizures, delirium tremens (or withdrawal delirium), or prolonged heavy alcohol (or sedative-hypnotic) consumption, who are minimally symptomatic or asymptomatic and are admitted to the hospital for other reasons, should typically be prophylactically treated with STANDING benzodiazepines  >> if a fixed-dose regimen is utilized, patients should still be reassessed frequently, and additional doses of medication given if a score of 8+ is achieved on the CIWA-Ar  >> if such elevated CIWA-Ar scores are reached routinely, prophylaxis is not adequate, and the regimen should be adjusted accordingly  >> if delirium ensues, rule out and treat potential alternative etiologies, even if delirium tremens is likely   >> identify and correct metabolic derangements and volume deficits, and determine if ICU monitoring is warranted  >> in complicated withdrawal, benzodiazepines should be given via IV route whenever possible to assure proper absorption  >> titrate benzodiazepines to effect (ideally light sedation) which may entail the use of massive doses of medication  >> if the patient is incapacitated and the CIWA-Ar cannot be used, instead utilize the Vitale Agitation-Sedation Scale (RASS) with a goal of 0 to -2  >> in refractory delirium tremens (or withdrawal delirium), phenobarbital or propofol can be used; these agents are preferred to dexmedetomidine as they act directly on the SILAS and NMDA receptors, which underlie the derangements seen in alcohol (or sedative-hypnotic) withdrawal  >> intubation may be required in refractory DTs, especially with the use of propofol  >> post-withdrawal treatment is paramount; medically supervised withdrawal manages the patient through the withdrawal symptoms but does not treat alcohol (or  sedative-hypnotic) use disorders; patients completing withdrawal are at high risk of relapse to resumed alcohol (or sedative-hypnotic) consumption/use  >> if in a facility, patients should be given explicit plans for follow-up care PRIOR to discharge  >> follow-up may involve ongoing treatment through primary care or a specialized addiction treatment program or physician, and is dependent on a number of factors, including availability and patient willingness  >> continuing care, including pharmacotherapy (e.g., MAT) and psychosocial intervention for alcohol (or sedative-hypnotic) use disorders, are indicated    See below for pertinent laboratory and radiographic findings.    Tae Wallace MD  \[facility] [place] [facility]\""-[facility] PGY-2   [facility]      CHART REVIEW: available documentation has been reviewed, and pertinent elements of the chart have been incorporated into this evaluation where appropriate.       KEY & LINKS:        Y  = yes/endorses     N  = no/denies     U  = unknown/unable to assess    ADHD   AIMS   AUDIT   AUDIT-C   C-SSRS (Screen)   C-SSRS (Short)   C-SSRS (Full)   DAST   DAST-10   MILDRED-7   MoCA   PCL-5   PHQ-9   CASSIE   YMRS      PSYCHIATRIC SCREENINGS:       DIAGNOSTIC TESTING:   Results   **   Glu 88  12/12/2024  Li *   *  TSH 0.886  8/31/2024    HgA1c 5.4  11/13/2024  VPA *   *   FT4 *   *    Na 138  12/12/2024  CLZ *   *  WBC 5.55  12/12/2024    Cr 0.8  12/12/2024  ANC 3.6; 64.5;   12/12/2024   Hgb 12.2  12/12/2024     BUN 5 (L)  12/12/2024  Trop I *   *  HCT 35.2 (L)  12/12/2024     GFR >60.0  12/12/2024     12/10/2024   (L)  12/12/2024     Alb 3.2 (L)  12/12/2024   PRL *   *  B12 *   *     T Bili 0.5  12/12/2024  Chol *   *  B9 *   *    ALP 84  12/12/2024  TGs *   *  B1 *   *    AST 47 (H)  12/12/2024  HDL *   *  Vit D *   *     ALT 37  12/12/2024  LDL *   *  HIV Non-reactive  12/10/2024     INR 1.0  12/10/2024  Sel[place] *   *    Hep C Non-reactive  12/10/2024    GGT *   *  Lip 34  8/30/2024  RPR Non-reactive  1/26/2022    MCV 98  12/12/2024   NH4 *   *  UPT Negative  12/10/2024      PETH *   *  THC Negative  12/10/2024    ETOH 208 (H)  12/10/2024  JOANIE Negative  12/10/2024    EtG *   *  AMP Negative  12/10/2024     (A)  12/10/2024  OPI Negative  12/10/2024    BZO Negative  12/10/2024  MTD Negative  12/10/2024     BAR Negative  12/10/2024  BUP *   *    PCP Negative  12/10/2024  FEN *   *     Results for orders placed or performed during the hospital encounter of 12/10/24   EKG 12-lead    Collection Time: 12/10/24  2:58 PM   Result Value Ref Range    QRS Duration 80 ms    [facility] QTC Calculation 436 ms    Narrative    Test Reason : R56.9,    Vent. Rate :  97 BPM     Atrial Rate :  97 BPM     P-R Int : 120 ms          QRS Dur :  80 ms      QT Int : 344 ms       P-R-T Axes :  71  70  66 degrees    QTcB Int : 436 ms    Normal sinus rhythm  Nonspecific ST and T wave abnormality  Abnormal ECG  When compared with ECG of 30-Aug-2024 15:03,  Nonspecific T wave abnormality now evident in Anterior leads  Confirmed by Axel Tamayo ([id number]) on 12/10/2024 3:14:11 PM    Referred By: AAAREFERRAL SELF           Confirmed By: Axel Tamayo     Past Medical History:   Diagnosis Date    Anxiety     Depression     IUD (intrauterine device) in place         Consults  [facility] STEPDOWN FLEX 14WT

## 2024-12-12 NOTE — ASSESSMENT & PLAN NOTE
39 yoF with history of alcohol use disorder and prior history of alcohol withdrawal presenting with concerns for alcohol withdrawal. High risk with seizures, will schedule taper.     - Vitals q4h  - CIWA monitoring  - Diazepam 10mg q8, will plan on de escalation 12/13/24    - Diazepam 10mg if 2 criteria are met: Systolic BP>160, Diastolic BP >110 or Pulse >110  - continue Vitamin supplementation- Thiamine, Folic acid, Vit. B12, and Multivitamin qd

## 2024-12-12 NOTE — ASSESSMENT & PLAN NOTE
Patient's hemorrhage is due to gastrointestinal bleed, patient does not have a propensity for bleeding.. Patients most recent Hgb, Hct, platelets, and INR are listed below.  Recent Labs     12/10/24  1240 12/10/24  2059 12/11/24  0904 12/11/24  1948 12/12/24  1000   HGB 11.5*   < > 11.1* 10.8* 12.2   HCT 34.5*   < > 32.7* 31.8* 35.2*   *   < > 107* 112* 130*   INR 1.0  --   --   --   --     < > = values in this interval not displayed.     Plan  - Will trend hemoglobin/hematocrit daily as it has remained stable   - Will monitor and correct any coagulation defects  - Will transfuse if Hgb is <7g/dl (<8g/dl in cases of active ACS) or if patient has rapid bleeding leading to hemodynamic instability  - Stable now, no evidence of ongoing hematemesis currently. Will hold off on GI consult but low threshold to consult if patient continues to have s/s of bleeding.

## 2024-12-12 NOTE — SUBJECTIVE & OBJECTIVE
Interval History: Pt seen and examined this morning on grey OLVERA. She notices her HR increasing after she goes to the bathroom. Having some tremors. Appetite improving. Care plan reviewed. Otherwise, doing well and with no further complaints at this time.      Review of Systems  Objective:     Vital Signs (Most Recent):  Temp: 98.6 °F (37 °C) (12/12/24 1127)  Pulse: 93 (12/12/24 1127)  Resp: 18 (12/12/24 1127)  BP: 135/71 (12/12/24 1127)  SpO2: 99 % (12/12/24 1127) Vital Signs (24h Range):  Temp:  [98.1 °F (36.7 °C)-98.6 °F (37 °C)] 98.6 °F (37 °C)  Pulse:  [] 93  Resp:  [14-21] 18  SpO2:  [96 %-100 %] 99 %  BP: (121-139)/(71-86) 135/71     Weight: 70.5 kg (155 lb 6.8 oz)  Body mass index is 25.86 kg/m².  No intake or output data in the 24 hours ending 12/12/24 1351      Physical Exam  Constitutional:       Appearance: Normal appearance.   HENT:      Head: Normocephalic and atraumatic.      Nose: Nose normal.      Mouth/Throat:      Mouth: Mucous membranes are moist.   Eyes:      Extraocular Movements: Extraocular movements intact.      Pupils: Pupils are equal, round, and reactive to light.   Cardiovascular:      Rate and Rhythm: Normal rate and regular rhythm.      Heart sounds: No murmur heard.     No gallop.   Pulmonary:      Effort: Pulmonary effort is normal.      Breath sounds: Normal breath sounds. No wheezing or rales.   Abdominal:      General: Abdomen is flat. Bowel sounds are normal. There is no distension.      Palpations: Abdomen is soft.      Tenderness: There is no abdominal tenderness.   Musculoskeletal:         General: No swelling or tenderness. Normal range of motion.      Cervical back: Normal range of motion and neck supple.      Right lower leg: No edema.      Left lower leg: No edema.   Skin:     General: Skin is warm and dry.      Capillary Refill: Capillary refill takes less than 2 seconds.   Neurological:      General: No focal deficit present.      Mental Status: She is alert.  Mental status is at baseline.      Comments: Tremor    Psychiatric:         Mood and Affect: Mood normal.             Significant Labs: All pertinent labs within the past 24 hours have been reviewed.    Significant Imaging: I have reviewed all pertinent imaging results/findings within the past 24 hours.

## 2024-12-12 NOTE — PLAN OF CARE
Pt AAO, VSS, CIWA 8 or less throughout shift. No c/o pain or distress.  No acute events this shift, bed low and locked, call light and belongings in reach.    Problem: Adult Inpatient Plan of Care  Goal: Plan of Care Review  Outcome: Progressing  Goal: Patient-Specific Goal (Individualized)  Outcome: Progressing  Goal: Absence of Hospital-Acquired Illness or Injury  Outcome: Progressing  Goal: Optimal Comfort and Wellbeing  Outcome: Progressing  Goal: Readiness for Transition of Care  Outcome: Progressing     Problem: Gastrointestinal Bleeding  Goal: Optimal Coping with Acute Illness  Outcome: Progressing  Goal: Hemostasis  Outcome: Progressing

## 2024-12-12 NOTE — NURSING
Patient A&O x4. VSS. Spo2 wnl on room air. CIWA score 6. Iqra diazepam given. PRN tylenol given for headache. No acute events on pm shift. Instructed to call staff for assistance. No known needs voiced at this time. Bed is locked and in lowest position. Care is ongoing. Call light is within reach.

## 2024-12-12 NOTE — ASSESSMENT & PLAN NOTE
Anemia is likely due to acute blood loss which was from Gastritis . Most recent hemoglobin and hematocrit are listed below.  Recent Labs     12/11/24  0904 12/11/24  1948 12/12/24  1000   HGB 11.1* 10.8* 12.2   HCT 32.7* 31.8* 35.2*       Plan  - Monitor serial CBC: daily   - Transfuse PRBC if patient becomes hemodynamically unstable, symptomatic or H/H drops below 7/21.  - Patient has not received any PRBC transfusions to date  - Patient's anemia is currently stable

## 2024-12-12 NOTE — PROGRESS NOTES
[facility] ([place] [address])  [facility] Medicine  Progress Note    Patient Name: Verónica Piña  MRN: [id number]  Patient Class: IP- Inpatient   Admission Date: 12/10/2024  Length of Stay: 2 days  Attending Physician: Ann Marie Cruz MD  Primary Care Provider: Juan Barahona II, MD        Subjective     Principal Problem:Alcohol withdrawal syndrome with complication        HPI:  This is a 39 year old lady with anxiety/depression, alcohol use disorder, and history of alcohol withdrawals with DT and seizures who presents with hematemesis and concerns for a seizure. Patient states that she had dark red emesis earlier this morning of one episode. She also states that she had some shakes and concerns for a seizure. She states her last drink was 2 beers about a week ago and denies any other drugs or alcohol since then. Patient states that she is trying to quit and has had intermittent shaking episodes since stopping. She does complain of chest pain that goes from her throat to her abdomen and shortness of breath when she is vomiting. She denies any abdominal pain, fevers, chills. She states that she had one episode of emesis here but it was clear. She denies any history of varices or cirrhosis in the past.     Upon chart review, noted to have multiple admissions for alcohol withdrawals. She was not amenable to treatment then but is now open for outpatient treatment.    In the ED, patient was VSS. Hgb noted to be 11 from baseline of 14 from a couple of days ago. She also was noted to have an elevated alcohol level to 300s. Patient was admitted to hospital medicine for further management.    Overview/Hospital Course:  Patient admitted to hospital medicine for further management. Patient noted to have elevated CIWA over night of 12/10, prn lorazepam given. Uptitrated scheduled valium taper.     Interval History: Pt seen and examined this morning on grey OLVERA. She notices her HR increasing after  [facility] Progress Note    Subjective:     No acute events overnight, patient has no new complaints today,  Cardiovascular, respiratory, GI review of system negative except mentioned above      Assessment / Plan:  52-year-old female with past medical history of rheumatoid arthritis, peripheral neuropathy, chronic microcytic anemia, hypertension, obesity, depression.   Admitted with osteomyelitis of the left second toe.       left second toe cellulitis   showing changes consistent with possible  Osteomyelitis -->  lucency at the tip of the second toe distal phalanx  Note she has had a history of osteomyelitis in the past where she had failed intravenous antibiotics and wound care this is of the right foot/toe   She has been started on IV Cipro, IV vancomycin, IV metronidazole  Vascular surgery and wound care consult placed  -02/16 ABIs performed so no e/o PAD, request MRI W/WO Contrast to evaluate for confirmation of  osteomyelitis, check inflammatory markers ESR, wound culture , request Orthopedic surgery consult   February 17appreciate orthopedic surgery team, osteomyelitis is ruled out by orthopedic,  continue IV antibiotics, consider transitioning to oral antibiotics soon  February 18 some improvement of cellulitis, still has red streaking proximally unto the foot, continue IV antibiotics 1 more day,    Rheumatoid arthritis with peripheral neuropathy  Adding complexity to medical case  - She is not being treated with DMARDs, and she can not confirm if she was sero positive diagnosed     Pre Diabetes Mellitus Type II   hemoglobin A1c, 6.5     Hypertension  Resume lisinoprilhydrochlorothiazide    Chronic microcytic anemia  Resume multivitamins with minerals    Additional medical history  Depression: Resume bupropion  Obesity      Chief Complaint : Foot Pain          Objective:  Visit Vitals  BP (!) 114/56 (BP 1 Location: Right upper arm, BP Patient Position: At rest)   Pulse 76   Temp 98.4 °F (36.9 °C)   Resp 18   Ht 5' 8\" (1.727 m)   Wt 103 kg (227 lb)   SpO2 92%   BMI 34.52 kg/m²                 Physical Exam:  General: No acute distress, speaking in full sentences, no use of accessory muscles   HEENT: Pupils equal and reactive to light and accommodation, oropharynx is clear   Neck: Supple, no lymphadenopathy, no JVD   Lungs: Clear to auscultation bilaterally   Cardiovascular: Regular rate and rhythm with normal S1 and S2   Abdomen: Soft, nontender, nondistended, normoactive bowel sounds   Extremities: Left heel ulcer chronic, left 2nd toe ulcer, foul smelling   Neuro: Nonfocal, A&O x3   Psych: Normal affect     Intake and Output:  Date 02/17/21 0700 - 02/18/21 0659 02/18/21 0700 - 02/19/21 0659   Shift 3103-7043 4713-8543 24 Hour Total 6616-4228 7294-4364 24 Hour Total   INTAKE   P.O. 480 500 980        P. O. 480 500 980      I. V.(mL/kg/hr)  4700(3.8) 4700(1.9)        I.V.  4700 4700      Shift Total(mL/kg) 480(4.7) 5200(50.5) 7744(87.3)      OUTPUT   Urine(mL/kg/hr)           Urine Occurrence(s) 1 x 3 x 4 x      Shift Total(mL/kg)          5200 5680      Weight (kg) 103 103 103 103 103 103       LAB:  Admission on 02/15/2021   Component Date Value    WBC 02/15/2021 8.4     RBC 02/15/2021 4.55     HGB 02/15/2021 11.5*    HCT 02/15/2021 36.1     MCV 02/15/2021 79.3*    MCH 02/15/2021 25.3*    MCHC 02/15/2021 31.9     RDW 02/15/2021 15.4*    PLATELET 92/34/0471 619     MPV 02/15/2021 9.8     ABSOLUTE NRBC 02/15/2021 0.00     DF 02/15/2021 AUTOMATED     NEUTROPHILS 02/15/2021 71     LYMPHOCYTES 02/15/2021 22     MONOCYTES 02/15/2021 6     EOSINOPHILS 02/15/2021 2     BASOPHILS 02/15/2021 0     IMMATURE GRANULOCYTES 02/15/2021 0     ABS. NEUTROPHILS 02/15/2021 5.9     ABS. LYMPHOCYTES 02/15/2021 1.8     ABS. MONOCYTES 02/15/2021 0.5     ABS. EOSINOPHILS 02/15/2021 0.2     ABS. BASOPHILS 02/15/2021 0.0     ABS. IMM.  GRANS. 02/15/2021 0.0     Sodium 02/15/2021 136     she goes to the bathroom. Having some tremors. Appetite improving. Care plan reviewed. Otherwise, doing well and with no further complaints at this time.      Review of Systems  Objective:     Vital Signs (Most Recent):  Temp: 98.6 °F (37 °C) (12/12/24 1127)  Pulse: 93 (12/12/24 1127)  Resp: 18 (12/12/24 1127)  BP: 135/71 (12/12/24 1127)  SpO2: 99 % (12/12/24 1127) Vital Signs (24h Range):  Temp:  [98.1 °F (36.7 °C)-98.6 °F (37 °C)] 98.6 °F (37 °C)  Pulse:  [] 93  Resp:  [14-21] 18  SpO2:  [96 %-100 %] 99 %  BP: (121-139)/(71-86) 135/71     Weight: 70.5 kg (155 lb 6.8 oz)  Body mass index is 25.86 kg/m².  No intake or output data in the 24 hours ending 12/12/24 1351      Physical Exam  Constitutional:       Appearance: Normal appearance.   HENT:      Head: Normocephalic and atraumatic.      Nose: Nose normal.      Mouth/Throat:      Mouth: Mucous membranes are moist.   Eyes:      Extraocular Movements: Extraocular movements intact.      Pupils: Pupils are equal, round, and reactive to light.   Cardiovascular:      Rate and Rhythm: Normal rate and regular rhythm.      Heart sounds: No murmur heard.     No gallop.   Pulmonary:      Effort: Pulmonary effort is normal.      Breath sounds: Normal breath sounds. No wheezing or rales.   Abdominal:      General: Abdomen is flat. Bowel sounds are normal. There is no distension.      Palpations: Abdomen is soft.      Tenderness: There is no abdominal tenderness.   Musculoskeletal:         General: No swelling or tenderness. Normal range of motion.      Cervical back: Normal range of motion and neck supple.      Right lower leg: No edema.      Left lower leg: No edema.   Skin:     General: Skin is warm and dry.      Capillary Refill: Capillary refill takes less than 2 seconds.   Neurological:      General: No focal deficit present.      Mental Status: She is alert. Mental status is at baseline.      Comments: Tremor    Psychiatric:         Mood and Affect: Mood normal.              Significant Labs: All pertinent labs within the past 24 hours have been reviewed.    Significant Imaging: I have reviewed all pertinent imaging results/findings within the past 24 hours.      Assessment and Plan     * Alcohol withdrawal syndrome with complication  39 yoF with history of alcohol use disorder and prior history of alcohol withdrawal presenting with concerns for alcohol withdrawal. High risk with seizures, will schedule taper.     - Vitals q4h  - CIWA monitoring  - Diazepam 10mg q8, will plan on de escalation 12/13/24    - Diazepam 10mg if 2 criteria are met: Systolic BP>160, Diastolic BP >110 or Pulse >110  - continue Vitamin supplementation- Thiamine, Folic acid, Vit. B12, and Multivitamin qd        Upper GI bleed  Patient's hemorrhage is due to gastrointestinal bleed, patient does not have a propensity for bleeding.. Patients most recent Hgb, Hct, platelets, and INR are listed below.  Recent Labs     12/10/24  1240 12/10/24  2059 12/11/24  0904 12/11/24 1948 12/12/24  1000   HGB 11.5*   < > 11.1* 10.8* 12.2   HCT 34.5*   < > 32.7* 31.8* 35.2*   *   < > 107* 112* 130*   INR 1.0  --   --   --   --     < > = values in this interval not displayed.     Plan  - Will trend hemoglobin/hematocrit daily as it has remained stable   - Will monitor and correct any coagulation defects  - Will transfuse if Hgb is <7g/dl (<8g/dl in cases of active ACS) or if patient has rapid bleeding leading to hemodynamic instability  - Stable now, no evidence of ongoing hematemesis currently. Will hold off on GI consult but low threshold to consult if patient continues to have s/s of bleeding.     Acute blood loss anemia  Anemia is likely due to acute blood loss which was from Gastritis . Most recent hemoglobin and hematocrit are listed below.  Recent Labs     12/11/24  0904 12/11/24 1948 12/12/24  1000   HGB 11.1* 10.8* 12.2   HCT 32.7* 31.8* 35.2*       Plan  - Monitor serial CBC: daily   - Transfuse PRBC  Potassium 02/15/2021 3.7     Chloride 02/15/2021 99     CO2 02/15/2021 28     Anion gap 02/15/2021 9     Glucose 02/15/2021 183*    BUN 02/15/2021 18     Creatinine 02/15/2021 0.91     GFR est AA 02/15/2021 >60     GFR est non-AA 02/15/2021 >60     Calcium 02/15/2021 9.7     Bilirubin, total 02/15/2021 0.4     ALT (SGPT) 02/15/2021 23     AST (SGOT) 02/15/2021 14*    Alk. phosphatase 02/15/2021 92     Protein, total 02/15/2021 8.3*    Albumin 02/15/2021 3.7     Globulin 02/15/2021 4.6*    A-G Ratio 02/15/2021 0.8*    Lactic acid 02/15/2021 2.3*    Special Requests: 02/15/2021                      Value:LEFT  Antecubital      Culture result: 02/15/2021 NO GROWTH 3 DAYS     Special Requests: 02/16/2021 RIGHT FOREARM     Culture result: 02/16/2021 NO GROWTH 2 DAYS     Lactic acid 02/16/2021 1.4     Sodium 02/16/2021 136     Potassium 02/16/2021 3.6     Chloride 02/16/2021 101     CO2 02/16/2021 28     Anion gap 02/16/2021 7     Glucose 02/16/2021 146*    BUN 02/16/2021 15     Creatinine 02/16/2021 0.75     GFR est AA 02/16/2021 >60     GFR est non-AA 02/16/2021 >60     Calcium 02/16/2021 9.1     Bilirubin, total 02/16/2021 0.3     ALT (SGPT) 02/16/2021 20     AST (SGOT) 02/16/2021 10*    Alk. phosphatase 02/16/2021 76     Protein, total 02/16/2021 7.1     Albumin 02/16/2021 3.2*    Globulin 02/16/2021 3.9*    A-G Ratio 02/16/2021 0.8*    WBC 02/16/2021 6.4     RBC 02/16/2021 3.88*    HGB 02/16/2021 9.9*    HCT 02/16/2021 30.5*    MCV 02/16/2021 78.6*    MCH 02/16/2021 25.5*    MCHC 02/16/2021 32.5     RDW 02/16/2021 15.5*    PLATELET 29/96/2652 963     MPV 02/16/2021 9.6     ABSOLUTE NRBC 02/16/2021 0.00     DF 02/16/2021 AUTOMATED     NEUTROPHILS 02/16/2021 67     LYMPHOCYTES 02/16/2021 24     MONOCYTES 02/16/2021 7     EOSINOPHILS 02/16/2021 2     BASOPHILS 02/16/2021 0     IMMATURE GRANULOCYTES 02/16/2021 0     ABS. NEUTROPHILS 02/16/2021 4.3     ABS. LYMPHOCYTES 02/16/2021 1.5     ABS. MONOCYTES 02/16/2021 0.4     ABS. EOSINOPHILS 02/16/2021 0.1     ABS. BASOPHILS 02/16/2021 0.0     ABS. IMM. GRANS. 02/16/2021 0.0     Glucose (POC) 02/16/2021 150*    Hemoglobin A1c 02/16/2021 6.4*    Est. average glucose 02/16/2021 137     Sodium 02/17/2021 138     Potassium 02/17/2021 3.9     Chloride 02/17/2021 104     CO2 02/17/2021 30     Anion gap 02/17/2021 4*    Glucose 02/17/2021 105*    BUN 02/17/2021 12     Creatinine 02/17/2021 0.78     GFR est AA 02/17/2021 >60     GFR est non-AA 02/17/2021 >60     Calcium 02/17/2021 8.7     Bilirubin, total 02/17/2021 0.3     ALT (SGPT) 02/17/2021 20     AST (SGOT) 02/17/2021 16     Alk. phosphatase 02/17/2021 77     Protein, total 02/17/2021 6.9     Albumin 02/17/2021 2.9*    Globulin 02/17/2021 4.0*    A-G Ratio 02/17/2021 0.7*    WBC 02/17/2021 4.0*    RBC 02/17/2021 3.80*    HGB 02/17/2021 9.5*    HCT 02/17/2021 31.0*    MCV 02/17/2021 81.6     MCH 02/17/2021 25.0*    MCHC 02/17/2021 30.6*    RDW 02/17/2021 15.3*    PLATELET 92/87/0337 535     MPV 02/17/2021 9.4     ABSOLUTE NRBC 02/17/2021 0.00     DF 02/17/2021 AUTOMATED     NEUTROPHILS 02/17/2021 56     LYMPHOCYTES 02/17/2021 29     MONOCYTES 02/17/2021 10     EOSINOPHILS 02/17/2021 4     BASOPHILS 02/17/2021 0     IMMATURE GRANULOCYTES 02/17/2021 1     ABS. NEUTROPHILS 02/17/2021 2.2     ABS. LYMPHOCYTES 02/17/2021 1.2     ABS. MONOCYTES 02/17/2021 0.4     ABS. EOSINOPHILS 02/17/2021 0.2     ABS. BASOPHILS 02/17/2021 0.0     ABS. IMM. GRANS. 02/17/2021 0.0     Sed rate, automated 02/17/2021 73*    Special Requests: 02/17/2021 Heel     GRAM STAIN 02/17/2021 PENDING     Culture result: 02/17/2021 NO GROWTH AFTER SHORT PERIOD OF INCUBATION. FURTHER RESULTS TO FOLLOW AFTER OVERNIGHT INCUBATION.      Special Requests: 02/17/2021 2nd Toe     GRAM STAIN 02/17/2021 PENDING     Culture result: 02/17/2021 PENDING     Sodium if patient becomes hemodynamically unstable, symptomatic or H/H drops below 7/21.  - Patient has not received any PRBC transfusions to date  - Patient's anemia is currently stable    Generalized anxiety disorder  Continue home hydroxyzine and paxil      Pericardial cyst  Noted on CT chest 11/13. Asymptomatic     - will f/u outpatient for monitoring and management        VTE Risk Mitigation (From admission, onward)           Ordered     IP VTE LOW RISK PATIENT  Once         12/10/24 1533     Place sequential compression device  Until discontinued         12/10/24 1533                    Discharge Planning   FOZIA: 12/15/2024     Code Status: Full Code   Medical Readiness for Discharge Date:   Discharge Plan A: Home with family                        Ann Marie Cruz MD  Department of Hospital Medicine   [facility] ([place])     02/18/2021 141     Potassium 02/18/2021 3.9     Chloride 02/18/2021 107     CO2 02/18/2021 30     Anion gap 02/18/2021 4*    Glucose 02/18/2021 147*    BUN 02/18/2021 12     Creatinine 02/18/2021 0.74     GFR est AA 02/18/2021 >60     GFR est non-AA 02/18/2021 >60     Calcium 02/18/2021 8.8     Bilirubin, total 02/18/2021 0.2     ALT (SGPT) 02/18/2021 21     AST (SGOT) 02/18/2021 19     Alk. phosphatase 02/18/2021 73     Protein, total 02/18/2021 6.5     Albumin 02/18/2021 2.8*    Globulin 02/18/2021 3.7*    A-G Ratio 02/18/2021 0.8*    WBC 02/18/2021 3.1*    RBC 02/18/2021 3.71*    HGB 02/18/2021 9.3*    HCT 02/18/2021 30.2*    MCV 02/18/2021 81.4     MCH 02/18/2021 25.1*    MCHC 02/18/2021 30.8*    RDW 02/18/2021 15.2*    PLATELET 44/78/7050 126*    MPV 02/18/2021 9.7     ABSOLUTE NRBC 02/18/2021 0.00     DF 02/18/2021 AUTOMATED     NEUTROPHILS 02/18/2021 43     LYMPHOCYTES 02/18/2021 41     MONOCYTES 02/18/2021 11     EOSINOPHILS 02/18/2021 5     BASOPHILS 02/18/2021 0     IMMATURE GRANULOCYTES 02/18/2021 0     ABS. NEUTROPHILS 02/18/2021 1.3*    ABS. LYMPHOCYTES 02/18/2021 1.3     ABS. MONOCYTES 02/18/2021 0.3     ABS. EOSINOPHILS 02/18/2021 0.2     ABS. BASOPHILS 02/18/2021 0.0     ABS. IMM. GRANS. 02/18/2021 0.0        IMAGING:  Xr 2nd Toe Lt Min 2 V    Result Date: 2/15/2021  1. Apparent lucency at the tip of the second toe distal phalanx, on the lateral view, raising the question of osteomyelitis. Note that MRI is more sensitive for evaluation of osteomyelitis. 2. No acute fracture. Mri Foot Lt W Wo Cont    Result Date: 2/17/2021  Soft tissue infection involving the second toe without evidence of acute osteomyelitis. Duplex Low Ext Arteries With Raquel    Result Date: 2/16/2021  No high-grade stenosis or large vessel occlusion identified. RAQUEL measurements are within normal limits. EKG:  No results found for this or any previous visit.           Karina Sue DO  2/18/2021 9:58 AM

## 2024-12-13 LAB
ALBUMIN SERPL BCP-MCNC: 3.2 G/DL (ref 3.5–5.2)
ALP SERPL-CCNC: 89 U/L (ref 40–150)
ALT SERPL W/O P-5'-P-CCNC: 37 U/L (ref 10–44)
ANION GAP SERPL CALC-SCNC: 10 MMOL/L (ref 8–16)
AST SERPL-CCNC: 44 U/L (ref 10–40)
BASOPHILS # BLD AUTO: 0.08 K/UL (ref 0–0.2)
BASOPHILS NFR BLD: 1.3 % (ref 0–1.9)
BILIRUB SERPL-MCNC: 0.3 MG/DL (ref 0.1–1)
BUN SERPL-MCNC: 6 MG/DL (ref 6–20)
CALCIUM SERPL-MCNC: 8.8 MG/DL (ref 8.7–10.5)
CHLORIDE SERPL-SCNC: 106 MMOL/L (ref 95–110)
CO2 SERPL-SCNC: 23 MMOL/L (ref 23–29)
CREAT SERPL-MCNC: 0.7 MG/DL (ref 0.5–1.4)
DIFFERENTIAL METHOD BLD: ABNORMAL
EOSINOPHIL # BLD AUTO: 0.3 K/UL (ref 0–0.5)
EOSINOPHIL NFR BLD: 4.8 % (ref 0–8)
ERYTHROCYTE [DISTWIDTH] IN BLOOD BY AUTOMATED COUNT: 14.1 % (ref 11.5–14.5)
EST. GFR  (NO RACE VARIABLE): >60 ML/MIN/1.73 M^2
GLUCOSE SERPL-MCNC: 98 MG/DL (ref 70–110)
HCT VFR BLD AUTO: 34.2 % (ref 37–48.5)
HGB BLD-MCNC: 11.4 G/DL (ref 12–16)
IMM GRANULOCYTES # BLD AUTO: 0.01 K/UL (ref 0–0.04)
IMM GRANULOCYTES NFR BLD AUTO: 0.2 % (ref 0–0.5)
LYMPHOCYTES # BLD AUTO: 1.4 K/UL (ref 1–4.8)
LYMPHOCYTES NFR BLD: 23.6 % (ref 18–48)
MAGNESIUM SERPL-MCNC: 1.9 MG/DL (ref 1.6–2.6)
MCH RBC QN AUTO: 32.1 PG (ref 27–31)
MCHC RBC AUTO-ENTMCNC: 33.3 G/DL (ref 32–36)
MCV RBC AUTO: 96 FL (ref 82–98)
MONOCYTES # BLD AUTO: 0.7 K/UL (ref 0.3–1)
MONOCYTES NFR BLD: 11.5 % (ref 4–15)
NEUTROPHILS # BLD AUTO: 3.5 K/UL (ref 1.8–7.7)
NEUTROPHILS NFR BLD: 58.6 % (ref 38–73)
NRBC BLD-RTO: 0 /100 WBC
PHOSPHATE SERPL-MCNC: 4.3 MG/DL (ref 2.7–4.5)
PLATELET # BLD AUTO: 128 K/UL (ref 150–450)
PMV BLD AUTO: 10.1 FL (ref 9.2–12.9)
POTASSIUM SERPL-SCNC: 3.6 MMOL/L (ref 3.5–5.1)
PROT SERPL-MCNC: 5.9 G/DL (ref 6–8.4)
RBC # BLD AUTO: 3.55 M/UL (ref 4–5.4)
SODIUM SERPL-SCNC: 139 MMOL/L (ref 136–145)
WBC # BLD AUTO: 6.02 K/UL (ref 3.9–12.7)

## 2024-12-13 PROCEDURE — 83735 ASSAY OF MAGNESIUM: CPT

## 2024-12-13 PROCEDURE — 25000003 PHARM REV CODE 250: Performed by: STUDENT IN AN ORGANIZED HEALTH CARE EDUCATION/TRAINING PROGRAM

## 2024-12-13 PROCEDURE — 20600001 HC STEP DOWN PRIVATE ROOM

## 2024-12-13 PROCEDURE — 36415 COLL VENOUS BLD VENIPUNCTURE: CPT

## 2024-12-13 PROCEDURE — 80053 COMPREHEN METABOLIC PANEL: CPT

## 2024-12-13 PROCEDURE — 84100 ASSAY OF PHOSPHORUS: CPT

## 2024-12-13 PROCEDURE — 25000003 PHARM REV CODE 250

## 2024-12-13 PROCEDURE — 85025 COMPLETE CBC W/AUTO DIFF WBC: CPT | Performed by: STUDENT IN AN ORGANIZED HEALTH CARE EDUCATION/TRAINING PROGRAM

## 2024-12-13 RX ORDER — DIAZEPAM 5 MG/1
10 TABLET ORAL EVERY 12 HOURS
Status: DISCONTINUED | OUTPATIENT
Start: 2024-12-13 | End: 2024-12-14

## 2024-12-13 RX ADMIN — DIAZEPAM 10 MG: 5 TABLET ORAL at 08:12

## 2024-12-13 RX ADMIN — PANTOPRAZOLE SODIUM 40 MG: 40 TABLET, DELAYED RELEASE ORAL at 09:12

## 2024-12-13 RX ADMIN — DIAZEPAM 10 MG: 5 TABLET ORAL at 06:12

## 2024-12-13 RX ADMIN — Medication 100 MG: at 09:12

## 2024-12-13 RX ADMIN — POLYETHYLENE GLYCOL 3350 17 G: 17 POWDER, FOR SOLUTION ORAL at 09:12

## 2024-12-13 RX ADMIN — FOLIC ACID 1 MG: 1 TABLET ORAL at 09:12

## 2024-12-13 RX ADMIN — POTASSIUM BICARBONATE 40 MEQ: 391 TABLET, EFFERVESCENT ORAL at 08:12

## 2024-12-13 RX ADMIN — PAROXETINE HYDROCHLORIDE 20 MG: 10 TABLET, FILM COATED ORAL at 06:12

## 2024-12-13 RX ADMIN — THERA TABS 1 TABLET: TAB at 09:12

## 2024-12-13 NOTE — PLAN OF CARE
Pt AAO, VSS, resting comfortably in bed throughout shift.  Valium tapered to q12. CIWA 6 throughout shift.    No acute events this shift, bed low and locked, call light and belongings in reach.      Problem: Adult Inpatient Plan of Care  Goal: Plan of Care Review  Outcome: Progressing  Goal: Patient-Specific Goal (Individualized)  Outcome: Progressing  Goal: Absence of Hospital-Acquired Illness or Injury  Outcome: Progressing  Goal: Optimal Comfort and Wellbeing  Outcome: Progressing  Goal: Readiness for Transition of Care  Outcome: Progressing     Problem: Gastrointestinal Bleeding  Goal: Optimal Coping with Acute Illness  Outcome: Progressing  Goal: Hemostasis  Outcome: Progressing

## 2024-12-13 NOTE — SUBJECTIVE & OBJECTIVE
Interval History:Pt seen and examined this morning on rounds. WADE. Had some anxiety overnight. Has tachycardia with activity. Tremors improving. Care plan reviewed. Otherwise, doing well and with no further complaints at this time.        Objective:     Vital Signs (Most Recent):  Temp: 98.7 °F (37.1 °C) (12/13/24 1133)  Pulse: 90 (12/13/24 1133)  Resp: 18 (12/13/24 1133)  BP: 118/72 (12/13/24 1133)  SpO2: 97 % (12/13/24 1133) Vital Signs (24h Range):  Temp:  [98.1 °F (36.7 °C)-98.7 °F (37.1 °C)] 98.7 °F (37.1 °C)  Pulse:  [81-97] 90  Resp:  [12-19] 18  SpO2:  [97 %-100 %] 97 %  BP: (112-118)/(63-75) 118/72     Weight: 70.5 kg (155 lb 6.8 oz)  Body mass index is 25.86 kg/m².  No intake or output data in the 24 hours ending 12/13/24 1507      Physical Exam  Constitutional:       Appearance: Normal appearance.   HENT:      Head: Normocephalic and atraumatic.      Nose: Nose normal.      Mouth/Throat:      Mouth: Mucous membranes are moist.   Eyes:      Extraocular Movements: Extraocular movements intact.      Pupils: Pupils are equal, round, and reactive to light.   Cardiovascular:      Rate and Rhythm: Normal rate and regular rhythm.      Heart sounds: No murmur heard.     No gallop.   Pulmonary:      Effort: Pulmonary effort is normal.      Breath sounds: Normal breath sounds. No wheezing or rales.   Abdominal:      General: Abdomen is flat. Bowel sounds are normal. There is no distension.      Palpations: Abdomen is soft.      Tenderness: There is no abdominal tenderness.   Musculoskeletal:         General: No swelling or tenderness. Normal range of motion.      Cervical back: Normal range of motion and neck supple.      Right lower leg: No edema.      Left lower leg: No edema.   Skin:     General: Skin is warm and dry.      Capillary Refill: Capillary refill takes less than 2 seconds.   Neurological:      General: No focal deficit present.      Mental Status: She is alert. Mental status is at baseline.    Psychiatric:         Mood and Affect: Mood normal.             Significant Labs: All pertinent labs within the past 24 hours have been reviewed.    Significant Imaging: I have reviewed all pertinent imaging results/findings within the past 24 hours.

## 2024-12-13 NOTE — ASSESSMENT & PLAN NOTE
39 yoF with history of alcohol use disorder and prior history of alcohol withdrawal presenting with concerns for alcohol withdrawal. High risk with seizures, will schedule taper.     - Vitals q4h  - CIWA monitoring  - Diazepam 10mg q8, de escalate to BID   - Diazepam 10mg if 2 criteria are met: Systolic BP>160, Diastolic BP >110 or Pulse >110  - continue Vitamin supplementation- Thiamine, Folic acid, Vit. B12, and Multivitamin qd

## 2024-12-13 NOTE — ASSESSMENT & PLAN NOTE
Patient's hemorrhage is due to gastrointestinal bleed, patient does not have a propensity for bleeding.. Patients most recent Hgb, Hct, platelets, and INR are listed below.  Recent Labs     12/11/24  1948 12/12/24  1000 12/13/24  0216   HGB 10.8* 12.2 11.4*   HCT 31.8* 35.2* 34.2*   * 130* 128*     Plan  - Will trend hemoglobin/hematocrit daily as it has remained stable   - Will monitor and correct any coagulation defects  - Will transfuse if Hgb is <7g/dl (<8g/dl in cases of active ACS) or if patient has rapid bleeding leading to hemodynamic instability  - Stable now, no evidence of ongoing hematemesis currently. Will hold off on GI consult but low threshold to consult if patient continues to have s/s of bleeding.

## 2024-12-13 NOTE — PROGRESS NOTES
[facility] [address])  [facility]  Progress Note    Patient Name: Verónica Piña  MRN: [id number]  Patient Class: IP- Inpatient   Admission Date: 12/10/2024  Length of Stay: 3 days  Attending Physician: Ann Marie Cruz MD  Primary Care Provider: Juan Barahona II, MD        Subjective     Principal Problem:Alcohol withdrawal syndrome with complication        HPI:  This is a 39 year old lady with anxiety/depression, alcohol use disorder, and history of alcohol withdrawals with DT and seizures who presents with hematemesis and concerns for a seizure. Patient states that she had dark red emesis earlier this morning of one episode. She also states that she had some shakes and concerns for a seizure. She states her last drink was 2 beers about a week ago and denies any other drugs or alcohol since then. Patient states that she is trying to quit and has had intermittent shaking episodes since stopping. She does complain of chest pain that goes from her throat to her abdomen and shortness of breath when she is vomiting. She denies any abdominal pain, fevers, chills. She states that she had one episode of emesis here but it was clear. She denies any history of varices or cirrhosis in the past.     Upon chart review, noted to have multiple admissions for alcohol withdrawals. She was not amenable to treatment then but is now open for outpatient treatment.    In the ED, patient was VSS. Hgb noted to be 11 from baseline of 14 from a couple of days ago. She also was noted to have an elevated alcohol level to 300s. Patient was admitted to hospital medicine for further management.    Overview/Hospital Course:  Patient admitted to hospital medicine for further management. Patient noted to have elevated CIWA over night of 12/10, prn lorazepam given. Uptitrated scheduled valium taper.     Interval History:Pt seen and examined this morning on rounds. WADE. Had some anxiety overnight. Has  tachycardia with activity. Tremors improving. Care plan reviewed. Otherwise, doing well and with no further complaints at this time.        Objective:     Vital Signs (Most Recent):  Temp: 98.7 °F (37.1 °C) (12/13/24 1133)  Pulse: 90 (12/13/24 1133)  Resp: 18 (12/13/24 1133)  BP: 118/72 (12/13/24 1133)  SpO2: 97 % (12/13/24 1133) Vital Signs (24h Range):  Temp:  [98.1 °F (36.7 °C)-98.7 °F (37.1 °C)] 98.7 °F (37.1 °C)  Pulse:  [81-97] 90  Resp:  [12-19] 18  SpO2:  [97 %-100 %] 97 %  BP: (112-118)/(63-75) 118/72     Weight: 70.5 kg (155 lb 6.8 oz)  Body mass index is 25.86 kg/m².  No intake or output data in the 24 hours ending 12/13/24 1507      Physical Exam  Constitutional:       Appearance: Normal appearance.   HENT:      Head: Normocephalic and atraumatic.      Nose: Nose normal.      Mouth/Throat:      Mouth: Mucous membranes are moist.   Eyes:      Extraocular Movements: Extraocular movements intact.      Pupils: Pupils are equal, round, and reactive to light.   Cardiovascular:      Rate and Rhythm: Normal rate and regular rhythm.      Heart sounds: No murmur heard.     No gallop.   Pulmonary:      Effort: Pulmonary effort is normal.      Breath sounds: Normal breath sounds. No wheezing or rales.   Abdominal:      General: Abdomen is flat. Bowel sounds are normal. There is no distension.      Palpations: Abdomen is soft.      Tenderness: There is no abdominal tenderness.   Musculoskeletal:         General: No swelling or tenderness. Normal range of motion.      Cervical back: Normal range of motion and neck supple.      Right lower leg: No edema.      Left lower leg: No edema.   Skin:     General: Skin is warm and dry.      Capillary Refill: Capillary refill takes less than 2 seconds.   Neurological:      General: No focal deficit present.      Mental Status: She is alert. Mental status is at baseline.   Psychiatric:         Mood and Affect: Mood normal.             Significant Labs: All pertinent labs within  the past 24 hours have been reviewed.    Significant Imaging: I have reviewed all pertinent imaging results/findings within the past 24 hours.    Assessment and Plan     * Alcohol withdrawal syndrome with complication  39 yoF with history of alcohol use disorder and prior history of alcohol withdrawal presenting with concerns for alcohol withdrawal. High risk with seizures, will schedule taper.     - Vitals q4h  - CIWA monitoring  - Diazepam 10mg q8, de escalate to BID   - Diazepam 10mg if 2 criteria are met: Systolic BP>160, Diastolic BP >110 or Pulse >110  - continue Vitamin supplementation- Thiamine, Folic acid, Vit. B12, and Multivitamin qd        Upper GI bleed  Patient's hemorrhage is due to gastrointestinal bleed, patient does not have a propensity for bleeding.. Patients most recent Hgb, Hct, platelets, and INR are listed below.  Recent Labs     12/11/24 1948 12/12/24  1000 12/13/24  0216   HGB 10.8* 12.2 11.4*   HCT 31.8* 35.2* 34.2*   * 130* 128*     Plan  - Will trend hemoglobin/hematocrit daily as it has remained stable   - Will monitor and correct any coagulation defects  - Will transfuse if Hgb is <7g/dl (<8g/dl in cases of active ACS) or if patient has rapid bleeding leading to hemodynamic instability  - Stable now, no evidence of ongoing hematemesis currently. Will hold off on GI consult but low threshold to consult if patient continues to have s/s of bleeding.     Acute blood loss anemia  Anemia is likely due to acute blood loss which was from Gastritis . Most recent hemoglobin and hematocrit are listed below.  Recent Labs     12/11/24 1948 12/12/24  1000 12/13/24  0216   HGB 10.8* 12.2 11.4*   HCT 31.8* 35.2* 34.2*     Plan  - Monitor serial CBC: daily   - Transfuse PRBC if patient becomes hemodynamically unstable, symptomatic or H/H drops below 7/21.  - Patient has not received any PRBC transfusions to date  - Patient's anemia is currently stable    Generalized anxiety disorder  Continue  home hydroxyzine and paxil      Pericardial cyst  Noted on CT chest 11/13. Asymptomatic     - will f/u outpatient for monitoring and management        VTE Risk Mitigation (From admission, onward)           Ordered     IP VTE LOW RISK PATIENT  Once         12/10/24 1533     Place sequential compression device  Until discontinued         12/10/24 1533                    Discharge Planning   FOZIA: 12/15/2024     Code Status: Full Code   Medical Readiness for Discharge Date:   Discharge Plan A: Home with family                        Ann Marie Cruz MD  Department of Hospital Medicine   [facility] - [facility]down Flex ([place]

## 2024-12-13 NOTE — PLAN OF CARE
Pt AAO, VSS, tachy at times on tele Med team Q aware.  Pt ambulates independently in room without distress.  CIWA 6 or less throughout shift not requiring PRN valium.    No acute events this shift, bed low and locked, call light and belongings in reach.     Problem: Adult Inpatient Plan of Care  Goal: Plan of Care Review  Outcome: Progressing  Goal: Patient-Specific Goal (Individualized)  Outcome: Progressing  Goal: Absence of Hospital-Acquired Illness or Injury  Outcome: Progressing  Goal: Optimal Comfort and Wellbeing  Outcome: Progressing  Goal: Readiness for Transition of Care  Outcome: Progressing     Problem: Gastrointestinal Bleeding  Goal: Optimal Coping with Acute Illness  Outcome: Progressing  Goal: Hemostasis  Outcome: Progressing

## 2024-12-13 NOTE — PLAN OF CARE
A&o x4. VSS. Spo2 wnl on room air. Patient was very anxious at approximately 1945. Patient stated that she felt like she needed a drink and had the urges to consume alcohol. PRN IV Valium was offered and given at 2011 with moderate relief. Iqra PO Valium was given per mar. Full relief obtained. No c/o of pain, n/v or headaches. Instructed to call staff for assistance. Care is ongoing. Bed is locked and in lowest position. No known needs at this time. Call light is within reach.     Problem: Adult Inpatient Plan of Care  Goal: Plan of Care Review  Outcome: Progressing  Goal: Patient-Specific Goal (Individualized)  Outcome: Progressing  Goal: Absence of Hospital-Acquired Illness or Injury  Outcome: Progressing  Goal: Optimal Comfort and Wellbeing  Outcome: Progressing  Goal: Readiness for Transition of Care  Outcome: Progressing     Problem: Gastrointestinal Bleeding  Goal: Optimal Coping with Acute Illness  Outcome: Progressing  Goal: Hemostasis  Outcome: Progressing

## 2024-12-13 NOTE — ASSESSMENT & PLAN NOTE
Anemia is likely due to acute blood loss which was from Gastritis . Most recent hemoglobin and hematocrit are listed below.  Recent Labs     12/11/24  1948 12/12/24  1000 12/13/24  0216   HGB 10.8* 12.2 11.4*   HCT 31.8* 35.2* 34.2*     Plan  - Monitor serial CBC: daily   - Transfuse PRBC if patient becomes hemodynamically unstable, symptomatic or H/H drops below 7/21.  - Patient has not received any PRBC transfusions to date  - Patient's anemia is currently stable

## 2024-12-14 LAB
ALBUMIN SERPL BCP-MCNC: 3.3 G/DL (ref 3.5–5.2)
ALP SERPL-CCNC: 85 U/L (ref 40–150)
ALT SERPL W/O P-5'-P-CCNC: 33 U/L (ref 10–44)
ANION GAP SERPL CALC-SCNC: 9 MMOL/L (ref 8–16)
AST SERPL-CCNC: 36 U/L (ref 10–40)
BASOPHILS # BLD AUTO: 0.07 K/UL (ref 0–0.2)
BASOPHILS NFR BLD: 1.1 % (ref 0–1.9)
BILIRUB SERPL-MCNC: 0.3 MG/DL (ref 0.1–1)
BUN SERPL-MCNC: 4 MG/DL (ref 6–20)
CALCIUM SERPL-MCNC: 9.1 MG/DL (ref 8.7–10.5)
CHLORIDE SERPL-SCNC: 105 MMOL/L (ref 95–110)
CO2 SERPL-SCNC: 23 MMOL/L (ref 23–29)
CREAT SERPL-MCNC: 0.8 MG/DL (ref 0.5–1.4)
DIFFERENTIAL METHOD BLD: ABNORMAL
EOSINOPHIL # BLD AUTO: 0.3 K/UL (ref 0–0.5)
EOSINOPHIL NFR BLD: 4.2 % (ref 0–8)
ERYTHROCYTE [DISTWIDTH] IN BLOOD BY AUTOMATED COUNT: 14.3 % (ref 11.5–14.5)
EST. GFR  (NO RACE VARIABLE): >60 ML/MIN/1.73 M^2
FOLATE SERPL-MCNC: 14.1 NG/ML (ref 4–24)
GLUCOSE SERPL-MCNC: 81 MG/DL (ref 70–110)
HCT VFR BLD AUTO: 36.7 % (ref 37–48.5)
HGB BLD-MCNC: 11.8 G/DL (ref 12–16)
IMM GRANULOCYTES # BLD AUTO: 0.02 K/UL (ref 0–0.04)
IMM GRANULOCYTES NFR BLD AUTO: 0.3 % (ref 0–0.5)
LYMPHOCYTES # BLD AUTO: 1.3 K/UL (ref 1–4.8)
LYMPHOCYTES NFR BLD: 21.2 % (ref 18–48)
MAGNESIUM SERPL-MCNC: 2 MG/DL (ref 1.6–2.6)
MCH RBC QN AUTO: 32 PG (ref 27–31)
MCHC RBC AUTO-ENTMCNC: 32.2 G/DL (ref 32–36)
MCV RBC AUTO: 100 FL (ref 82–98)
MONOCYTES # BLD AUTO: 0.9 K/UL (ref 0.3–1)
MONOCYTES NFR BLD: 14.3 % (ref 4–15)
NEUTROPHILS # BLD AUTO: 3.7 K/UL (ref 1.8–7.7)
NEUTROPHILS NFR BLD: 58.9 % (ref 38–73)
NRBC BLD-RTO: 0 /100 WBC
PHOSPHATE SERPL-MCNC: 3.5 MG/DL (ref 2.7–4.5)
PLATELET # BLD AUTO: 160 K/UL (ref 150–450)
PMV BLD AUTO: 9.7 FL (ref 9.2–12.9)
POCT GLUCOSE: 101 MG/DL (ref 70–110)
POCT GLUCOSE: 68 MG/DL (ref 70–110)
POTASSIUM SERPL-SCNC: 3.9 MMOL/L (ref 3.5–5.1)
PROT SERPL-MCNC: 6.1 G/DL (ref 6–8.4)
RBC # BLD AUTO: 3.69 M/UL (ref 4–5.4)
SODIUM SERPL-SCNC: 137 MMOL/L (ref 136–145)
VIT B12 SERPL-MCNC: 300 PG/ML (ref 210–950)
WBC # BLD AUTO: 6.22 K/UL (ref 3.9–12.7)

## 2024-12-14 PROCEDURE — 25000003 PHARM REV CODE 250: Performed by: STUDENT IN AN ORGANIZED HEALTH CARE EDUCATION/TRAINING PROGRAM

## 2024-12-14 PROCEDURE — 83735 ASSAY OF MAGNESIUM: CPT | Performed by: STUDENT IN AN ORGANIZED HEALTH CARE EDUCATION/TRAINING PROGRAM

## 2024-12-14 PROCEDURE — 82607 VITAMIN B-12: CPT | Performed by: STUDENT IN AN ORGANIZED HEALTH CARE EDUCATION/TRAINING PROGRAM

## 2024-12-14 PROCEDURE — 80053 COMPREHEN METABOLIC PANEL: CPT | Performed by: STUDENT IN AN ORGANIZED HEALTH CARE EDUCATION/TRAINING PROGRAM

## 2024-12-14 PROCEDURE — 36415 COLL VENOUS BLD VENIPUNCTURE: CPT | Performed by: STUDENT IN AN ORGANIZED HEALTH CARE EDUCATION/TRAINING PROGRAM

## 2024-12-14 PROCEDURE — 25000003 PHARM REV CODE 250

## 2024-12-14 PROCEDURE — 20600001 HC STEP DOWN PRIVATE ROOM

## 2024-12-14 PROCEDURE — 82746 ASSAY OF FOLIC ACID SERUM: CPT | Performed by: STUDENT IN AN ORGANIZED HEALTH CARE EDUCATION/TRAINING PROGRAM

## 2024-12-14 PROCEDURE — 85025 COMPLETE CBC W/AUTO DIFF WBC: CPT | Performed by: STUDENT IN AN ORGANIZED HEALTH CARE EDUCATION/TRAINING PROGRAM

## 2024-12-14 PROCEDURE — 84100 ASSAY OF PHOSPHORUS: CPT | Performed by: STUDENT IN AN ORGANIZED HEALTH CARE EDUCATION/TRAINING PROGRAM

## 2024-12-14 RX ORDER — DIAZEPAM 5 MG/1
5 TABLET ORAL EVERY 12 HOURS
Status: DISCONTINUED | OUTPATIENT
Start: 2024-12-14 | End: 2024-12-15

## 2024-12-14 RX ADMIN — Medication 100 MG: at 08:12

## 2024-12-14 RX ADMIN — THERA TABS 1 TABLET: TAB at 08:12

## 2024-12-14 RX ADMIN — POLYETHYLENE GLYCOL 3350 17 G: 17 POWDER, FOR SOLUTION ORAL at 08:12

## 2024-12-14 RX ADMIN — HYDROXYZINE HYDROCHLORIDE 10 MG: 10 TABLET ORAL at 04:12

## 2024-12-14 RX ADMIN — DEXTROSE MONOHYDRATE 125 ML: 100 INJECTION, SOLUTION INTRAVENOUS at 07:12

## 2024-12-14 RX ADMIN — DIAZEPAM 5 MG: 5 TABLET ORAL at 08:12

## 2024-12-14 RX ADMIN — PANTOPRAZOLE SODIUM 40 MG: 40 TABLET, DELAYED RELEASE ORAL at 08:12

## 2024-12-14 RX ADMIN — FOLIC ACID 1 MG: 1 TABLET ORAL at 08:12

## 2024-12-14 RX ADMIN — PAROXETINE HYDROCHLORIDE 20 MG: 10 TABLET, FILM COATED ORAL at 06:12

## 2024-12-14 NOTE — PLAN OF CARE
POC in progress. Pt on q12h valium closet. Tolerating well. VSS. Slight tremor continues. No other active symptoms for ETOH withdrawals. CIWA q8h. SR up x2. Bed ijn lowest position. Call light and personal belongings  within reach.   Problem: Adult Inpatient Plan of Care  Goal: Plan of Care Review  Outcome: Progressing     Problem: Gastrointestinal Bleeding  Goal: Optimal Coping with Acute Illness  Outcome: Progressing

## 2024-12-14 NOTE — SUBJECTIVE & OBJECTIVE
Interval History: Pt seen and examined this morning on rounds. WADE. Tolerated valium wean. Care plan reviewed. Otherwise, doing well and with no further complaints at this time.      Objective:     Vital Signs (Most Recent):  Temp: 97.7 °F (36.5 °C) (12/14/24 0728)  Pulse: 81 (12/14/24 0728)  Resp: 18 (12/14/24 0728)  BP: 123/67 (12/14/24 0728)  SpO2: 100 % (12/14/24 0728) Vital Signs (24h Range):  Temp:  [97.7 °F (36.5 °C)-98.7 °F (37.1 °C)] 97.7 °F (36.5 °C)  Pulse:  [81-99] 81  Resp:  [16-20] 18  SpO2:  [97 %-100 %] 100 %  BP: (114-132)/(63-78) 123/67     Weight: 70.5 kg (155 lb 6.8 oz)  Body mass index is 25.86 kg/m².    Intake/Output Summary (Last 24 hours) at 12/14/2024 0806  Last data filed at 12/14/2024 0623  Gross per 24 hour   Intake 300 ml   Output --   Net 300 ml         Physical Exam  Constitutional:       Appearance: Normal appearance.   HENT:      Head: Normocephalic and atraumatic.      Nose: Nose normal.      Mouth/Throat:      Mouth: Mucous membranes are moist.   Eyes:      Extraocular Movements: Extraocular movements intact.      Pupils: Pupils are equal, round, and reactive to light.   Cardiovascular:      Rate and Rhythm: Normal rate and regular rhythm.      Heart sounds: No murmur heard.     No gallop.   Pulmonary:      Effort: Pulmonary effort is normal.      Breath sounds: Normal breath sounds. No wheezing or rales.   Abdominal:      General: Abdomen is flat. Bowel sounds are normal. There is no distension.      Palpations: Abdomen is soft.      Tenderness: There is no abdominal tenderness.   Musculoskeletal:         General: No swelling or tenderness. Normal range of motion.      Cervical back: Normal range of motion and neck supple.      Right lower leg: No edema.      Left lower leg: No edema.   Skin:     General: Skin is warm and dry.      Capillary Refill: Capillary refill takes less than 2 seconds.   Neurological:      General: No focal deficit present.      Mental Status: She is  alert. Mental status is at baseline.   Psychiatric:         Mood and Affect: Mood normal.             Significant Labs: All pertinent labs within the past 24 hours have been reviewed.    Significant Imaging: I have reviewed all pertinent imaging results/findings within the past 24 hours.

## 2024-12-14 NOTE — ASSESSMENT & PLAN NOTE
39 yoF with history of alcohol use disorder and prior history of alcohol withdrawal presenting with concerns for alcohol withdrawal. High risk with seizures, will schedule taper.     - Vitals q4h  - CIWA monitoring  - Diazepam 10mg BID will wean to 5 mg BID  - Diazepam 10mg if 2 criteria are met: Systolic BP>160, Diastolic BP >110 or Pulse >110  - continue Vitamin supplementation- Thiamine, Folic acid, Vit. B12, and Multivitamin qd

## 2024-12-14 NOTE — PROGRESS NOTES
[facility]an - [facility] [address])  [facility]  Progress Note    Patient Name: Verónica Piña  MRN: [id number]  Patient Class: IP- Inpatient   Admission Date: 12/10/2024  Length of Stay: 4 days  Attending Physician: Ann Marie Cruz MD  Primary Care Provider: Juan Barahona II, MD        Subjective     Principal Problem:Alcohol withdrawal syndrome with complication        HPI:  This is a 39 year old lady with anxiety/depression, alcohol use disorder, and history of alcohol withdrawals with DT and seizures who presents with hematemesis and concerns for a seizure. Patient states that she had dark red emesis earlier this morning of one episode. She also states that she had some shakes and concerns for a seizure. She states her last drink was 2 beers about a week ago and denies any other drugs or alcohol since then. Patient states that she is trying to quit and has had intermittent shaking episodes since stopping. She does complain of chest pain that goes from her throat to her abdomen and shortness of breath when she is vomiting. She denies any abdominal pain, fevers, chills. She states that she had one episode of emesis here but it was clear. She denies any history of varices or cirrhosis in the past.     Upon chart review, noted to have multiple admissions for alcohol withdrawals. She was not amenable to treatment then but is now open for outpatient treatment.    In the ED, patient was VSS. Hgb noted to be 11 from baseline of 14 from a couple of days ago. She also was noted to have an elevated alcohol level to 300s. Patient was admitted to hospital medicine for further management.    Overview/Hospital Course:  Patient admitted to hospital medicine for further management. Patient noted to have elevated CIWA over night of 12/10, prn lorazepam given. Uptitrated scheduled valium taper.     Interval History: Pt seen and examined this morning on rounds. WADE. Tolerated valium wean. Care plan  reviewed. Otherwise, doing well and with no further complaints at this time.      Objective:     Vital Signs (Most Recent):  Temp: 97.7 °F (36.5 °C) (12/14/24 0728)  Pulse: 81 (12/14/24 0728)  Resp: 18 (12/14/24 0728)  BP: 123/67 (12/14/24 0728)  SpO2: 100 % (12/14/24 0728) Vital Signs (24h Range):  Temp:  [97.7 °F (36.5 °C)-98.7 °F (37.1 °C)] 97.7 °F (36.5 °C)  Pulse:  [81-99] 81  Resp:  [16-20] 18  SpO2:  [97 %-100 %] 100 %  BP: (114-132)/(63-78) 123/67     Weight: 70.5 kg (155 lb 6.8 oz)  Body mass index is 25.86 kg/m².    Intake/Output Summary (Last 24 hours) at 12/14/2024 0806  Last data filed at 12/14/2024 0623  Gross per 24 hour   Intake 300 ml   Output --   Net 300 ml         Physical Exam  Constitutional:       Appearance: Normal appearance.   HENT:      Head: Normocephalic and atraumatic.      Nose: Nose normal.      Mouth/Throat:      Mouth: Mucous membranes are moist.   Eyes:      Extraocular Movements: Extraocular movements intact.      Pupils: Pupils are equal, round, and reactive to light.   Cardiovascular:      Rate and Rhythm: Normal rate and regular rhythm.      Heart sounds: No murmur heard.     No gallop.   Pulmonary:      Effort: Pulmonary effort is normal.      Breath sounds: Normal breath sounds. No wheezing or rales.   Abdominal:      General: Abdomen is flat. Bowel sounds are normal. There is no distension.      Palpations: Abdomen is soft.      Tenderness: There is no abdominal tenderness.   Musculoskeletal:         General: No swelling or tenderness. Normal range of motion.      Cervical back: Normal range of motion and neck supple.      Right lower leg: No edema.      Left lower leg: No edema.   Skin:     General: Skin is warm and dry.      Capillary Refill: Capillary refill takes less than 2 seconds.   Neurological:      General: No focal deficit present.      Mental Status: She is alert. Mental status is at baseline.   Psychiatric:         Mood and Affect: Mood normal.              Significant Labs: All pertinent labs within the past 24 hours have been reviewed.    Significant Imaging: I have reviewed all pertinent imaging results/findings within the past 24 hours.    Assessment and Plan     * Alcohol withdrawal syndrome with complication  39 yoF with history of alcohol use disorder and prior history of alcohol withdrawal presenting with concerns for alcohol withdrawal. High risk with seizures, will schedule taper.     - Vitals q4h  - CIWA monitoring  - Diazepam 10mg BID will wean to 5 mg BID  - Diazepam 10mg if 2 criteria are met: Systolic BP>160, Diastolic BP >110 or Pulse >110  - continue Vitamin supplementation- Thiamine, Folic acid, Vit. B12, and Multivitamin qd        Upper GI bleed  Patient's hemorrhage is due to gastrointestinal bleed, patient does not have a propensity for bleeding.. Patients most recent Hgb, Hct, platelets, and INR are listed below.  Recent Labs     12/11/24 1948 12/12/24  1000 12/13/24  0216   HGB 10.8* 12.2 11.4*   HCT 31.8* 35.2* 34.2*   * 130* 128*     Plan  - Will trend hemoglobin/hematocrit daily as it has remained stable   - Will monitor and correct any coagulation defects  - Will transfuse if Hgb is <7g/dl (<8g/dl in cases of active ACS) or if patient has rapid bleeding leading to hemodynamic instability  - Stable now, no evidence of ongoing hematemesis currently. Will hold off on GI consult but low threshold to consult if patient continues to have s/s of bleeding.     Acute blood loss anemia  Anemia is likely due to acute blood loss which was from Gastritis . Most recent hemoglobin and hematocrit are listed below.  Recent Labs     12/11/24 1948 12/12/24  1000 12/13/24  0216   HGB 10.8* 12.2 11.4*   HCT 31.8* 35.2* 34.2*     Plan  - Monitor serial CBC: daily   - Transfuse PRBC if patient becomes hemodynamically unstable, symptomatic or H/H drops below 7/21.  - Patient has not received any PRBC transfusions to date  - Patient's anemia is currently  stable    Generalized anxiety disorder  Continue home hydroxyzine and paxil      Pericardial cyst  Noted on CT chest 11/13. Asymptomatic     - will f/u outpatient for monitoring and management        VTE Risk Mitigation (From admission, onward)           Ordered     IP VTE LOW RISK PATIENT  Once         12/10/24 1533     Place sequential compression device  Until discontinued         12/10/24 1533                    Discharge Planning   FOZIA: 12/15/2024     Code Status: Full Code   Medical Readiness for Discharge Date: 12/15/24   Discharge Plan A: Home with family                  Ann Marie Cruz MD  Department of Hospital Medicine   [facility] - [facility] ([place])

## 2024-12-15 VITALS
HEART RATE: 91 BPM | TEMPERATURE: 99 F | OXYGEN SATURATION: 100 % | RESPIRATION RATE: 18 BRPM | BODY MASS INDEX: 25.9 KG/M2 | HEIGHT: 65 IN | WEIGHT: 155.44 LBS | DIASTOLIC BLOOD PRESSURE: 68 MMHG | SYSTOLIC BLOOD PRESSURE: 118 MMHG

## 2024-12-15 LAB
ALBUMIN SERPL BCP-MCNC: 3.2 G/DL (ref 3.5–5.2)
ALP SERPL-CCNC: 79 U/L (ref 40–150)
ALT SERPL W/O P-5'-P-CCNC: 32 U/L (ref 10–44)
ANION GAP SERPL CALC-SCNC: 8 MMOL/L (ref 8–16)
AST SERPL-CCNC: 32 U/L (ref 10–40)
BASOPHILS # BLD AUTO: 0.09 K/UL (ref 0–0.2)
BASOPHILS NFR BLD: 1.3 % (ref 0–1.9)
BILIRUB SERPL-MCNC: 0.3 MG/DL (ref 0.1–1)
BUN SERPL-MCNC: 5 MG/DL (ref 6–20)
CALCIUM SERPL-MCNC: 8.9 MG/DL (ref 8.7–10.5)
CHLORIDE SERPL-SCNC: 108 MMOL/L (ref 95–110)
CO2 SERPL-SCNC: 24 MMOL/L (ref 23–29)
CREAT SERPL-MCNC: 0.8 MG/DL (ref 0.5–1.4)
DIFFERENTIAL METHOD BLD: ABNORMAL
EOSINOPHIL # BLD AUTO: 0.3 K/UL (ref 0–0.5)
EOSINOPHIL NFR BLD: 4 % (ref 0–8)
ERYTHROCYTE [DISTWIDTH] IN BLOOD BY AUTOMATED COUNT: 14.3 % (ref 11.5–14.5)
EST. GFR  (NO RACE VARIABLE): >60 ML/MIN/1.73 M^2
GLUCOSE SERPL-MCNC: 87 MG/DL (ref 70–110)
HCT VFR BLD AUTO: 35 % (ref 37–48.5)
HGB BLD-MCNC: 11.2 G/DL (ref 12–16)
IMM GRANULOCYTES # BLD AUTO: 0.04 K/UL (ref 0–0.04)
IMM GRANULOCYTES NFR BLD AUTO: 0.6 % (ref 0–0.5)
LYMPHOCYTES # BLD AUTO: 1.6 K/UL (ref 1–4.8)
LYMPHOCYTES NFR BLD: 22.3 % (ref 18–48)
MAGNESIUM SERPL-MCNC: 2 MG/DL (ref 1.6–2.6)
MCH RBC QN AUTO: 32.5 PG (ref 27–31)
MCHC RBC AUTO-ENTMCNC: 32 G/DL (ref 32–36)
MCV RBC AUTO: 101 FL (ref 82–98)
MONOCYTES # BLD AUTO: 1 K/UL (ref 0.3–1)
MONOCYTES NFR BLD: 14.1 % (ref 4–15)
NEUTROPHILS # BLD AUTO: 4.1 K/UL (ref 1.8–7.7)
NEUTROPHILS NFR BLD: 57.7 % (ref 38–73)
NRBC BLD-RTO: 0 /100 WBC
PHOSPHATE SERPL-MCNC: 4.3 MG/DL (ref 2.7–4.5)
PLATELET # BLD AUTO: 184 K/UL (ref 150–450)
PMV BLD AUTO: 10.3 FL (ref 9.2–12.9)
POCT GLUCOSE: 82 MG/DL (ref 70–110)
POCT GLUCOSE: 98 MG/DL (ref 70–110)
POTASSIUM SERPL-SCNC: 4.1 MMOL/L (ref 3.5–5.1)
PROT SERPL-MCNC: 6 G/DL (ref 6–8.4)
RBC # BLD AUTO: 3.45 M/UL (ref 4–5.4)
SODIUM SERPL-SCNC: 140 MMOL/L (ref 136–145)
WBC # BLD AUTO: 7.04 K/UL (ref 3.9–12.7)

## 2024-12-15 PROCEDURE — 25000003 PHARM REV CODE 250: Performed by: STUDENT IN AN ORGANIZED HEALTH CARE EDUCATION/TRAINING PROGRAM

## 2024-12-15 PROCEDURE — 83735 ASSAY OF MAGNESIUM: CPT | Performed by: STUDENT IN AN ORGANIZED HEALTH CARE EDUCATION/TRAINING PROGRAM

## 2024-12-15 PROCEDURE — 84100 ASSAY OF PHOSPHORUS: CPT | Performed by: STUDENT IN AN ORGANIZED HEALTH CARE EDUCATION/TRAINING PROGRAM

## 2024-12-15 PROCEDURE — 36415 COLL VENOUS BLD VENIPUNCTURE: CPT | Performed by: STUDENT IN AN ORGANIZED HEALTH CARE EDUCATION/TRAINING PROGRAM

## 2024-12-15 PROCEDURE — 80053 COMPREHEN METABOLIC PANEL: CPT | Performed by: STUDENT IN AN ORGANIZED HEALTH CARE EDUCATION/TRAINING PROGRAM

## 2024-12-15 PROCEDURE — 25000003 PHARM REV CODE 250

## 2024-12-15 PROCEDURE — 85025 COMPLETE CBC W/AUTO DIFF WBC: CPT | Performed by: STUDENT IN AN ORGANIZED HEALTH CARE EDUCATION/TRAINING PROGRAM

## 2024-12-15 RX ORDER — HYDROXYZINE HYDROCHLORIDE 10 MG/1
10-20 TABLET, FILM COATED ORAL EVERY 4 HOURS PRN
Qty: 30 TABLET | Refills: 1 | Status: SHIPPED | OUTPATIENT
Start: 2024-12-15

## 2024-12-15 RX ORDER — DIAZEPAM 5 MG/1
5 TABLET ORAL DAILY
Status: DISCONTINUED | OUTPATIENT
Start: 2024-12-15 | End: 2024-12-15 | Stop reason: HOSPADM

## 2024-12-15 RX ORDER — PANTOPRAZOLE SODIUM 40 MG/1
40 TABLET, DELAYED RELEASE ORAL DAILY
Qty: 90 TABLET | Refills: 3 | Status: SHIPPED | OUTPATIENT
Start: 2024-12-16 | End: 2025-12-16

## 2024-12-15 RX ADMIN — FOLIC ACID 1 MG: 1 TABLET ORAL at 08:12

## 2024-12-15 RX ADMIN — Medication 100 MG: at 08:12

## 2024-12-15 RX ADMIN — PANTOPRAZOLE SODIUM 40 MG: 40 TABLET, DELAYED RELEASE ORAL at 08:12

## 2024-12-15 RX ADMIN — DIAZEPAM 5 MG: 5 TABLET ORAL at 08:12

## 2024-12-15 RX ADMIN — POLYETHYLENE GLYCOL 3350 17 G: 17 POWDER, FOR SOLUTION ORAL at 08:12

## 2024-12-15 RX ADMIN — THERA TABS 1 TABLET: TAB at 08:12

## 2024-12-15 RX ADMIN — PAROXETINE HYDROCHLORIDE 20 MG: 10 TABLET, FILM COATED ORAL at 08:12

## 2024-12-15 NOTE — ASSESSMENT & PLAN NOTE
39 yoF with history of alcohol use disorder and prior history of alcohol withdrawal presenting with concerns for alcohol withdrawal. High risk with seizures, will schedule taper.     - Vitals q4h  - CIWA monitoring  - weaned Diazepam to 5 mg daily   - Diazepam 10mg if 2 criteria are met: Systolic BP>160, Diastolic BP >110 or Pulse >110  - continue Vitamin supplementation- Thiamine, Folic acid, Vit. B12, and Multivitamin qd

## 2024-12-15 NOTE — ASSESSMENT & PLAN NOTE
Patient's hemorrhage is due to gastrointestinal bleed, patient does not have a propensity for bleeding.. Patients most recent Hgb, Hct, platelets, and INR are listed below.  Recent Labs     12/13/24  0216 12/14/24  0903 12/15/24  0500   HGB 11.4* 11.8* 11.2*   HCT 34.2* 36.7* 35.0*   * 160 184     Plan  - Will trend hemoglobin/hematocrit daily as it has remained stable   - Will monitor and correct any coagulation defects  - Will transfuse if Hgb is <7g/dl (<8g/dl in cases of active ACS) or if patient has rapid bleeding leading to hemodynamic instability  - Stable now, no evidence of ongoing hematemesis currently. Will hold off on GI consult but low threshold to consult if patient continues to have s/s of bleeding.

## 2024-12-15 NOTE — DISCHARGE SUMMARY
[facility] [address]  [facility] Medicine  Discharge Summary      Patient Name: Verónica Piña  MRN: [id number]  [facility]: [id number]  Patient Class: IP- Inpatient  Admission Date: 12/10/2024  Hospital Length of Stay: 5 days  Discharge Date and Time:  12/15/2024 10:42 AM  Attending Physician: Ann Marie Cruz MD   Discharging Provider: Ann Marie Cruz MD  Primary Care Provider: Juan Barahona II, MD  [facility] Medicine Team: [facility] [place] [facility] Ann Marie Cruz MD  Primary Care Team: [facility] [place] [facility]    HPI:   This is a 39 year old lady with anxiety/depression, alcohol use disorder, and history of alcohol withdrawals with DT and seizures who presents with hematemesis and concerns for a seizure. Patient states that she had dark red emesis earlier this morning of one episode. She also states that she had some shakes and concerns for a seizure. She states her last drink was 2 beers about a week ago and denies any other drugs or alcohol since then. Patient states that she is trying to quit and has had intermittent shaking episodes since stopping. She does complain of chest pain that goes from her throat to her abdomen and shortness of breath when she is vomiting. She denies any abdominal pain, fevers, chills. She states that she had one episode of emesis here but it was clear. She denies any history of varices or cirrhosis in the past.     Upon chart review, noted to have multiple admissions for alcohol withdrawals. She was not amenable to treatment then but is now open for outpatient treatment.    In the ED, patient was VSS. Hgb noted to be 11 from baseline of 14 from a couple of days ago. She also was noted to have an elevated alcohol level to 300s. Patient was admitted to hospital medicine for further management.    * No surgery found *      Hospital Course:   Patient admitted to hospital medicine for further management. Patient noted to have elevated CIWA over night of 12/10, prn lorazepam  given. Uptitrated scheduled valium taper. She tolerated taper well. No signs of GI bleeding during hospitalization and abdominal pain improved with PPI. She was weaned off Valium with plans for outpatient addiction treatment and possible Addiction follow up for medication for alcohol use.     Pt deemed appropriate for discharge; seen and examined prior to departure. Plan discussed with pt, who was agreeable and amenable; medications were discussed and reviewed, outpatient follow-up scheduled, ER precautions were given, all questions were answered to the pt's satisfaction, and she was subsequently discharged.       Goals of Care Treatment Preferences:  Code Status: Full Code      SDOH Screening:  The patient was screened for utility difficulties, food insecurity, transport difficulties, housing insecurity, and interpersonal safety and there were no concerns identified this admission.     Consults:   Consults (From admission, onward)          Status Ordering Provider     Inpatient consult to Psychiatry  Once        Provider:  (Not yet assigned)    JEZ Siddiqui            * Alcohol withdrawal syndrome with complication  39 yoF with history of alcohol use disorder and prior history of alcohol withdrawal presenting with concerns for alcohol withdrawal. High risk with seizures, will schedule taper.     - Vitals q4h  - CIWA monitoring  - weaned Diazepam to 5 mg daily   - Diazepam 10mg if 2 criteria are met: Systolic BP>160, Diastolic BP >110 or Pulse >110  - continue Vitamin supplementation- Thiamine, Folic acid, Vit. B12, and Multivitamin qd        Upper GI bleed  Patient's hemorrhage is due to gastrointestinal bleed, patient does not have a propensity for bleeding.. Patients most recent Hgb, Hct, platelets, and INR are listed below.  Recent Labs     12/13/24  0216 12/14/24  0903 12/15/24  0500   HGB 11.4* 11.8* 11.2*   HCT 34.2* 36.7* 35.0*   * 160 184     Plan  - Will trend hemoglobin/hematocrit daily  as it has remained stable   - Will monitor and correct any coagulation defects  - Will transfuse if Hgb is <7g/dl (<8g/dl in cases of active ACS) or if patient has rapid bleeding leading to hemodynamic instability  - Stable now, no evidence of ongoing hematemesis currently. Will hold off on GI consult but low threshold to consult if patient continues to have s/s of bleeding.     Acute blood loss anemia  Anemia is likely due to acute blood loss which was from Gastritis . Most recent hemoglobin and hematocrit are listed below.  Recent Labs     12/13/24  0216 12/14/24  0903 12/15/24  0500   HGB 11.4* 11.8* 11.2*   HCT 34.2* 36.7* 35.0*     Plan  - Monitor serial CBC: daily   - Transfuse PRBC if patient becomes hemodynamically unstable, symptomatic or H/H drops below 7/21.  - Patient has not received any PRBC transfusions to date  - Patient's anemia is currently stable    Generalized anxiety disorder  Continue home hydroxyzine and paxil      Pericardial cyst  Noted on CT chest 11/13. Asymptomatic     - will f/u outpatient for monitoring and management        Final Active Diagnoses:    Diagnosis Date Noted POA    PRINCIPAL PROBLEM:  Alcohol withdrawal syndrome with complication [F10.939] 08/31/2024 Yes    Upper GI bleed [K92.2] 08/31/2024 Yes    Acute blood loss anemia [D62] 12/10/2024 Yes    Generalized anxiety disorder [F41.1] 02/23/2022 Yes    Pericardial cyst [Q24.8] 12/10/2024 Not Applicable    Alcohol use disorder [F10.90] 12/12/2024 Yes      Problems Resolved During this Admission:       Discharged Condition: good    Disposition: Home or Self CareGood     Follow Up:   Follow-up Information       Juan Barahona II, MD Follow up in 1 week(s).    Specialty: Internal Medicine  Why: As needed  Contact information:  [facility]  [place] [id number] [phone]                           Patient Instructions:      Diet Adult Regular     Notify your health care provider if you  experience any of the following:  temperature >100.4     Notify your health care provider if you experience any of the following:  persistent nausea and vomiting or diarrhea     Notify your health care provider if you experience any of the following:  redness, tenderness, or signs of infection (pain, swelling, redness, odor or green/yellow discharge around incision site)     Notify your health care provider if you experience any of the following:  difficulty breathing or increased cough     Notify your health care provider if you experience any of the following:  worsening rash     Notify your health care provider if you experience any of the following:  persistent dizziness, light-headedness, or visual disturbances     Notify your health care provider if you experience any of the following:  increased confusion or weakness       Significant Diagnostic Studies: N/A    Pending Diagnostic Studies:       None           Medications:  Reconciled Home Medications:      Medication List        START taking these medications      multivitamin Tab  Take 1 tablet by mouth once daily.  Start taking on: December 16, 2024     pantoprazole 40 MG tablet  Commonly known as: PROTONIX  Take 1 tablet (40 mg total) by mouth once daily.  Start taking on: December 16, 2024            CONTINUE taking these medications      albuterol 90 mcg/actuation inhaler  Commonly known as: PROVENTIL HFA  Inhale 2 puffs into the lungs every 6 (six) hours as needed for Wheezing. Rescue     hydrOXYzine HCL 10 MG Tab  Commonly known as: ATARAX  Take 1-2 tablets (10-20 mg total) by mouth every 4 (four) hours as needed (anxiety).     levonorgestreL 52 mg IUD  Commonly known as: MIRENA  1 Intra Uterine Device by Intrauterine route once.     paroxetine 20 MG tablet  Commonly known as: PAXIL  Take 1 tablet (20 mg total) by mouth every morning.            STOP taking these medications      budesonide-formoterol 80-4.5 mcg 80-4.5 mcg/actuation Hfaa  Commonly  known as: SYMBICORT     ibuprofen 800 MG tablet  Commonly known as: ADVIL,MOTRIN            ASK your doctor about these medications      metoprolol tartrate 25 MG tablet  Commonly known as: LOPRESSOR  1 tablet with food Orally Twice a day              Indwelling Lines/Drains at time of discharge:   Lines/Drains/Airways       None                   Time spent on the discharge of patient: 35 minutes         Ann Marie Cruz MD  Department of Hospital Medicine  [facility] - Stepdown Flex ([place])

## 2024-12-15 NOTE — PLAN OF CARE
No acute events. Patient doing well on valium taper. Required atarax once for anxiety. Plan of care ongoing      Problem: Adult Inpatient Plan of Care  Goal: Plan of Care Review  Outcome: Progressing  Goal: Patient-Specific Goal (Individualized)  Outcome: Progressing  Goal: Absence of Hospital-Acquired Illness or Injury  Outcome: Progressing  Goal: Optimal Comfort and Wellbeing  Outcome: Progressing  Goal: Readiness for Transition of Care  Outcome: Progressing     Problem: Gastrointestinal Bleeding  Goal: Optimal Coping with Acute Illness  Outcome: Progressing  Goal: Hemostasis  Outcome: Progressing

## 2024-12-15 NOTE — PLAN OF CARE
Patient will discharge home with her grandfather.  Resources were given to patient for outpatient drug rehab centers and PCP's   12/15/24 1145   Final Note   Assessment Type Final Discharge Note   Anticipated Discharge Disposition Home   What phone number can be called within the next 1-3 days to see how you are doing after discharge? [phone]   Hospital Resources/Appts/Education Provided Appointments scheduled and added to AVS   Post-Acute Status   Discharge Delays None known at this time     [facility] [address] - [facility] ([place])  Discharge Final Note    Primary Care Provider: Juan Barahona II, MD    Expected Discharge Date: 12/15/2024    Final Discharge Note (most recent)       Final Note - 12/15/24 1145          Final Note    Assessment Type Final Discharge Note (P)      Anticipated Discharge Disposition Home or Self Care (P)      What phone number can be called within the next 1-3 days to see how you are doing after discharge? [phone] (P)      Hospital Resources/Appts/Education Provided Appointments scheduled and added to AVS (P)         Post-Acute Status    Discharge Delays None known at this time (P)                      Important Message from Medicare             Contact Info       Juan Barahona II, MD   Specialty: Internal Medicine   Relationship: PCP - General    [address] [facility]  [place] [address]   Phone: [phone]       Next Steps: Follow up in 1 week(s)    Instructions: As needed

## 2024-12-15 NOTE — PLAN OF CARE
Problem: Adult Inpatient Plan of Care  Goal: Plan of Care Review  Outcome: Met  Goal: Patient-Specific Goal (Individualized)  Outcome: Met  Goal: Absence of Hospital-Acquired Illness or Injury  Outcome: Met  Goal: Optimal Comfort and Wellbeing  Outcome: Met  Goal: Readiness for Transition of Care  Outcome: Met     Problem: Gastrointestinal Bleeding  Goal: Optimal Coping with Acute Illness  Outcome: Met  Goal: Hemostasis  Outcome: Met

## 2024-12-15 NOTE — ASSESSMENT & PLAN NOTE
Anemia is likely due to acute blood loss which was from Gastritis . Most recent hemoglobin and hematocrit are listed below.  Recent Labs     12/13/24  0216 12/14/24  0903 12/15/24  0500   HGB 11.4* 11.8* 11.2*   HCT 34.2* 36.7* 35.0*     Plan  - Monitor serial CBC: daily   - Transfuse PRBC if patient becomes hemodynamically unstable, symptomatic or H/H drops below 7/21.  - Patient has not received any PRBC transfusions to date  - Patient's anemia is currently stable

## 2024-12-15 NOTE — PROGRESS NOTES
[facility] [address]  [facility] Medicine  Progress Note    Patient Name: Verónica Piña  MRN: [id number]  Patient Class: IP- Inpatient   Admission Date: 12/10/2024  Length of Stay: 5 days  Attending Physician: Ann Marie Cruz MD  Primary Care Provider: Juan Barahona II, MD        Subjective     Principal Problem:Alcohol withdrawal syndrome with complication        HPI:  This is a 39 year old lady with anxiety/depression, alcohol use disorder, and history of alcohol withdrawals with DT and seizures who presents with hematemesis and concerns for a seizure. Patient states that she had dark red emesis earlier this morning of one episode. She also states that she had some shakes and concerns for a seizure. She states her last drink was 2 beers about a week ago and denies any other drugs or alcohol since then. Patient states that she is trying to quit and has had intermittent shaking episodes since stopping. She does complain of chest pain that goes from her throat to her abdomen and shortness of breath when she is vomiting. She denies any abdominal pain, fevers, chills. She states that she had one episode of emesis here but it was clear. She denies any history of varices or cirrhosis in the past.     Upon chart review, noted to have multiple admissions for alcohol withdrawals. She was not amenable to treatment then but is now open for outpatient treatment.    In the ED, patient was VSS. Hgb noted to be 11 from baseline of 14 from a couple of days ago. She also was noted to have an elevated alcohol level to 300s. Patient was admitted to hospital medicine for further management.    Overview/Hospital Course:  Patient admitted to hospital medicine for further management. Patient noted to have elevated CIWA over night of 12/10, prn lorazepam given. Uptitrated scheduled valium taper.     Interval History: NAEO. Tolerated valium wean.       Objective:     Vital Signs (Most Recent):  Temp: 99.2  °F (37.3 °C) (12/15/24 0327)  Pulse: 91 (12/15/24 0327)  Resp: 20 (12/15/24 0327)  BP: 127/70 (12/15/24 0327)  SpO2: 99 % (12/15/24 0327) Vital Signs (24h Range):  Temp:  [97.9 °F (36.6 °C)-99.2 °F (37.3 °C)] 99.2 °F (37.3 °C)  Pulse:  [74-93] 91  Resp:  [17-20] 20  SpO2:  [97 %-100 %] 99 %  BP: (100-128)/(56-73) 127/70     Weight: 70.5 kg (155 lb 6.8 oz)  Body mass index is 25.86 kg/m².  No intake or output data in the 24 hours ending 12/15/24 0759      Physical Exam  Constitutional:       Appearance: Normal appearance.   HENT:      Head: Normocephalic and atraumatic.      Nose: Nose normal.      Mouth/Throat:      Mouth: Mucous membranes are moist.   Eyes:      Extraocular Movements: Extraocular movements intact.      Pupils: Pupils are equal, round, and reactive to light.   Cardiovascular:      Rate and Rhythm: Normal rate and regular rhythm.      Heart sounds: No murmur heard.     No gallop.   Pulmonary:      Effort: Pulmonary effort is normal.      Breath sounds: Normal breath sounds. No wheezing or rales.   Abdominal:      General: Abdomen is flat. Bowel sounds are normal. There is no distension.      Palpations: Abdomen is soft.      Tenderness: There is no abdominal tenderness.   Musculoskeletal:         General: No swelling or tenderness. Normal range of motion.      Cervical back: Normal range of motion and neck supple.      Right lower leg: No edema.      Left lower leg: No edema.   Skin:     General: Skin is warm and dry.      Capillary Refill: Capillary refill takes less than 2 seconds.   Neurological:      General: No focal deficit present.      Mental Status: She is alert. Mental status is at baseline.   Psychiatric:         Mood and Affect: Mood normal.             Significant Labs: All pertinent labs within the past 24 hours have been reviewed.    Significant Imaging: I have reviewed all pertinent imaging results/findings within the past 24 hours.    Assessment and Plan     * Alcohol withdrawal  syndrome with complication  39 yoF with history of alcohol use disorder and prior history of alcohol withdrawal presenting with concerns for alcohol withdrawal. High risk with seizures, will schedule taper.     - Vitals q4h  - CIWA monitoring  - weaned Diazepam to 5 mg daily   - Diazepam 10mg if 2 criteria are met: Systolic BP>160, Diastolic BP >110 or Pulse >110  - continue Vitamin supplementation- Thiamine, Folic acid, Vit. B12, and Multivitamin qd        Upper GI bleed  Patient's hemorrhage is due to gastrointestinal bleed, patient does not have a propensity for bleeding.. Patients most recent Hgb, Hct, platelets, and INR are listed below.  Recent Labs     12/13/24 0216 12/14/24  0903 12/15/24  0500   HGB 11.4* 11.8* 11.2*   HCT 34.2* 36.7* 35.0*   * 160 184     Plan  - Will trend hemoglobin/hematocrit daily as it has remained stable   - Will monitor and correct any coagulation defects  - Will transfuse if Hgb is <7g/dl (<8g/dl in cases of active ACS) or if patient has rapid bleeding leading to hemodynamic instability  - Stable now, no evidence of ongoing hematemesis currently. Will hold off on GI consult but low threshold to consult if patient continues to have s/s of bleeding.     Acute blood loss anemia  Anemia is likely due to acute blood loss which was from Gastritis . Most recent hemoglobin and hematocrit are listed below.  Recent Labs     12/13/24  0216 12/14/24  0903 12/15/24  0500   HGB 11.4* 11.8* 11.2*   HCT 34.2* 36.7* 35.0*     Plan  - Monitor serial CBC: daily   - Transfuse PRBC if patient becomes hemodynamically unstable, symptomatic or H/H drops below 7/21.  - Patient has not received any PRBC transfusions to date  - Patient's anemia is currently stable    Generalized anxiety disorder  Continue home hydroxyzine and paxil      Pericardial cyst  Noted on CT chest 11/13. Asymptomatic     - will f/u outpatient for monitoring and management        VTE Risk Mitigation (From admission, onward)            Ordered     IP VTE LOW RISK PATIENT  Once         12/10/24 1533     Place sequential compression device  Until discontinued         12/10/24 1533                    Discharge Planning   FOZIA: 12/15/2024     Code Status: Full Code   Medical Readiness for Discharge Date:   Discharge Plan A: Home with family                        Ann Marie Cruz MD  Department of Hospital Medicine   [facility]

## 2024-12-15 NOTE — PLAN OF CARE
POCT glucose 68 - D10 IV given per order - recheck 101. Pt educated on signs and symptoms of hypoglycemia and instructed to notify staff if signs and symptoms are present. VSS on room air. Pt has not complaints or needs at this time. Instructed pt to call out for assistance or needs. Bed low and locked. Call light in reach. Side rails up x2 and padded. Seizure, aspiration, and fall risk precautions maintained.       Problem: Adult Inpatient Plan of Care  Goal: Plan of Care Review  Outcome: Progressing  Goal: Patient-Specific Goal (Individualized)  Outcome: Progressing  Goal: Absence of Hospital-Acquired Illness or Injury  Outcome: Progressing  Goal: Optimal Comfort and Wellbeing  Outcome: Progressing  Goal: Readiness for Transition of Care  Outcome: Progressing     Problem: Gastrointestinal Bleeding  Goal: Optimal Coping with Acute Illness  Outcome: Progressing  Goal: Hemostasis  Outcome: Progressing

## 2024-12-15 NOTE — SUBJECTIVE & OBJECTIVE
Interval History: NAEO. Tolerated valium wean.       Objective:     Vital Signs (Most Recent):  Temp: 99.2 °F (37.3 °C) (12/15/24 0327)  Pulse: 91 (12/15/24 0327)  Resp: 20 (12/15/24 0327)  BP: 127/70 (12/15/24 0327)  SpO2: 99 % (12/15/24 0327) Vital Signs (24h Range):  Temp:  [97.9 °F (36.6 °C)-99.2 °F (37.3 °C)] 99.2 °F (37.3 °C)  Pulse:  [74-93] 91  Resp:  [17-20] 20  SpO2:  [97 %-100 %] 99 %  BP: (100-128)/(56-73) 127/70     Weight: 70.5 kg (155 lb 6.8 oz)  Body mass index is 25.86 kg/m².  No intake or output data in the 24 hours ending 12/15/24 0759      Physical Exam  Constitutional:       Appearance: Normal appearance.   HENT:      Head: Normocephalic and atraumatic.      Nose: Nose normal.      Mouth/Throat:      Mouth: Mucous membranes are moist.   Eyes:      Extraocular Movements: Extraocular movements intact.      Pupils: Pupils are equal, round, and reactive to light.   Cardiovascular:      Rate and Rhythm: Normal rate and regular rhythm.      Heart sounds: No murmur heard.     No gallop.   Pulmonary:      Effort: Pulmonary effort is normal.      Breath sounds: Normal breath sounds. No wheezing or rales.   Abdominal:      General: Abdomen is flat. Bowel sounds are normal. There is no distension.      Palpations: Abdomen is soft.      Tenderness: There is no abdominal tenderness.   Musculoskeletal:         General: No swelling or tenderness. Normal range of motion.      Cervical back: Normal range of motion and neck supple.      Right lower leg: No edema.      Left lower leg: No edema.   Skin:     General: Skin is warm and dry.      Capillary Refill: Capillary refill takes less than 2 seconds.   Neurological:      General: No focal deficit present.      Mental Status: She is alert. Mental status is at baseline.   Psychiatric:         Mood and Affect: Mood normal.             Significant Labs: All pertinent labs within the past 24 hours have been reviewed.    Significant Imaging: I have reviewed all  pertinent imaging results/findings within the past 24 hours.